# Patient Record
Sex: MALE | Race: OTHER | URBAN - METROPOLITAN AREA
[De-identification: names, ages, dates, MRNs, and addresses within clinical notes are randomized per-mention and may not be internally consistent; named-entity substitution may affect disease eponyms.]

---

## 2023-04-11 ENCOUNTER — OFFICE VISIT (OUTPATIENT)
Dept: FAMILY MEDICINE CLINIC | Facility: CLINIC | Age: 28
End: 2023-04-11

## 2023-04-11 VITALS
DIASTOLIC BLOOD PRESSURE: 80 MMHG | HEIGHT: 71 IN | OXYGEN SATURATION: 98 % | WEIGHT: 190 LBS | HEART RATE: 80 BPM | BODY MASS INDEX: 26.6 KG/M2 | SYSTOLIC BLOOD PRESSURE: 130 MMHG | RESPIRATION RATE: 18 BRPM

## 2023-04-11 DIAGNOSIS — Z76.89 ENCOUNTER TO ESTABLISH CARE: Primary | ICD-10-CM

## 2023-04-11 DIAGNOSIS — K59.09 OTHER CONSTIPATION: ICD-10-CM

## 2023-04-11 DIAGNOSIS — R06.02 SOB (SHORTNESS OF BREATH): ICD-10-CM

## 2023-04-11 PROCEDURE — 3075F SYST BP GE 130 - 139MM HG: CPT | Performed by: FAMILY MEDICINE

## 2023-04-11 PROCEDURE — 99202 OFFICE O/P NEW SF 15 MIN: CPT | Performed by: FAMILY MEDICINE

## 2023-04-11 PROCEDURE — 3079F DIAST BP 80-89 MM HG: CPT | Performed by: FAMILY MEDICINE

## 2023-04-11 PROCEDURE — 3008F BODY MASS INDEX DOCD: CPT | Performed by: FAMILY MEDICINE

## 2023-04-11 RX ORDER — ALBUTEROL SULFATE 90 UG/1
AEROSOL, METERED RESPIRATORY (INHALATION)
COMMUNITY
Start: 2021-06-29

## 2023-04-11 RX ORDER — GARLIC EXTRACT 500 MG
1 CAPSULE ORAL DAILY
COMMUNITY

## 2023-10-25 ENCOUNTER — LAB ENCOUNTER (OUTPATIENT)
Dept: LAB | Age: 28
End: 2023-10-25
Attending: FAMILY MEDICINE

## 2023-10-25 ENCOUNTER — TELEMEDICINE (OUTPATIENT)
Dept: FAMILY MEDICINE CLINIC | Facility: CLINIC | Age: 28
End: 2023-10-25

## 2023-10-25 VITALS
RESPIRATION RATE: 16 BRPM | WEIGHT: 171.38 LBS | DIASTOLIC BLOOD PRESSURE: 71 MMHG | OXYGEN SATURATION: 97 % | HEART RATE: 70 BPM | BODY MASS INDEX: 24.26 KG/M2 | HEIGHT: 70.47 IN | TEMPERATURE: 98 F | SYSTOLIC BLOOD PRESSURE: 117 MMHG

## 2023-10-25 DIAGNOSIS — R63.4 WEIGHT LOSS: ICD-10-CM

## 2023-10-25 DIAGNOSIS — R53.83 FATIGUE, UNSPECIFIED TYPE: ICD-10-CM

## 2023-10-25 DIAGNOSIS — R35.0 URINARY FREQUENCY: Primary | ICD-10-CM

## 2023-10-25 PROCEDURE — 99214 OFFICE O/P EST MOD 30 MIN: CPT | Performed by: FAMILY MEDICINE

## 2023-10-25 PROCEDURE — 3046F HEMOGLOBIN A1C LEVEL >9.0%: CPT | Performed by: INTERNAL MEDICINE

## 2023-10-25 NOTE — H&P
HPI:    Cristobal Basilio is a 33year old male presents for video visit with multiple concerns. Over the past 2 months patient has noticed about a 20 pound, unintentional weight loss. Has not changed diet much. Was exercising regularly but recently stopped. Has also noticed an increase in thirst, at one point was drinking about 3 L of water per day. Feels he urinates frequently, every 1-2 hours. Over the past week, patient has been experiencing an increase in fatigue. Normal sleep habits. HISTORY:  No past medical history on file. No past surgical history on file. Family History   Problem Relation Age of Onset    Pancreatic Cancer Father     Pancreatic Cancer Paternal Grandfather     Uterine Cancer Other       Social History:   Social History     Socioeconomic History    Marital status:    Tobacco Use    Smoking status: Never    Smokeless tobacco: Never   Vaping Use    Vaping Use: Never used   Substance and Sexual Activity    Alcohol use: Yes     Comment: Socially    Drug use: Never        Medications (Active prior to today's visit):  Current Outpatient Medications   Medication Sig Dispense Refill    albuterol 108 (90 Base) MCG/ACT Inhalation Aero Soln Take one puff every 4-6 hours as needed for shortness of breathing (Patient not taking: Reported on 4/11/2023)      cholecalciferol 125 MCG (5000 UT) Oral Tab Take 1 tablet (5,000 Units total) by mouth daily. acidophilus-pectin Oral Cap Take 1 capsule by mouth daily. (Patient not taking: Reported on 4/11/2023)         Allergies:  No Known Allergies      Depression Screening (PHQ-2/PHQ-9): Over the LAST 2 WEEKS                         ROS:   Review of Systems   All other systems reviewed and are negative. PHYSICAL EXAM:   There were no vitals filed for this visit. Physical Exam  Constitutional:       General: He is not in acute distress. Pulmonary:      Effort: Pulmonary effort is normal. No respiratory distress.    Neurological: Mental Status: He is alert. Psychiatric:         Mood and Affect: Mood normal.         ASSESSMENT/PLAN:   (R35.0) Urinary frequency  (primary encounter diagnosis)  (R63.4) Weight loss  (R53.83) Fatigue, unspecified type  Plan: CBC W Differential W Platelet [E], Comp         Metabolic Panel (14) [E], TSH W Reflex To Free         T4 [E], Hemoglobin A1C [E]  -Possible type 2 diabetes, anemia, thyroid disease? Labs ordered, will come to clinic later today to have blood drawn. Will have patient weighed, vitals taken upon arrival.  Will continue to follow    I conducted a telehealth visit with the above named patient, which was completed using two-way, real-time interactive audio and video communication. This has been done in good ksenia to provide continuity of care in the best interest of the provider-patient relationship, due to the COVID -19 public health crisis/national emergency where restrictions of face-to-face office visits are ongoing. Every conscious effort was taken to allow for sufficient and adequate time to complete the visit. The patient was made aware of the limitations of the telehealth visit, including treatment limitations as no physical exam could be performed. The patient was advised to call 911 or to go to the ER in case there was an emergency. The patient was also advised of the potential privacy & security concerns related to the telehealth platform. The patient was made aware of where to find [facility] notice of privacy practices, telehealth consent form and other related consent forms and documents. which are located on the [facility] website. The patient verbally agreed to telehealth consent form, related consents and the risks discussed. Lastly, the patient confirmed that they were in [place]. Included in this visit, time may have been spent reviewing labs, medications, radiology tests and decision making.  Appropriate medical decision-making and tests are ordered as detailed in the plan of care above. Coding/billing information is submitted for this visit based on complexity of care and/or time spent for the visit. Responsible party/patient verbalized understanding of information discussed. No barriers to learning observed. Orders This Visit:  Orders Placed This Encounter      CBC W Differential W Platelet [E]      Comp Metabolic Panel (14) [E]      TSH W Reflex To Free T4 [E]      Hemoglobin A1C [E]      Meds This Visit:  Requested Prescriptions      No prescriptions requested or ordered in this encounter       Imaging & Referrals:  None       The 21st Century cures Act makes medical notes like these available to patients in the interest of transparency. However, be advised that this is a medical document. It is intended as peer to peer communication. It is written in medical language and may contain abbreviations or verbiage that are unfamiliar. It may appear blunt or direct. Medical documents are intended to carry relevant information, facts as evident, and the clinical opinion of the practitioner. This note was created by [employer] voice recognition. Errors in content may be related to improper recognition by the system; efforts to review and correct have been done but errors may still exist. Please contact me with any questions.        10/25/2023  Pam Sifuentes MD

## 2023-10-26 ENCOUNTER — TELEPHONE (OUTPATIENT)
Dept: FAMILY MEDICINE CLINIC | Facility: CLINIC | Age: 28
End: 2023-10-26

## 2023-10-26 ENCOUNTER — HOSPITAL ENCOUNTER (EMERGENCY)
Facility: HOSPITAL | Age: 28
Discharge: HOME | End: 2023-10-26
Attending: EMERGENCY MEDICINE
Payer: COMMERCIAL

## 2023-10-26 VITALS
HEIGHT: 71 IN | RESPIRATION RATE: 21 BRPM | DIASTOLIC BLOOD PRESSURE: 99 MMHG | SYSTOLIC BLOOD PRESSURE: 123 MMHG | BODY MASS INDEX: 23.52 KG/M2 | WEIGHT: 168 LBS | OXYGEN SATURATION: 98 % | TEMPERATURE: 99 F | HEART RATE: 68 BPM

## 2023-10-26 DIAGNOSIS — E11.65 TYPE 2 DIABETES MELLITUS WITH HYPERGLYCEMIA, WITHOUT LONG-TERM CURRENT USE OF INSULIN (HCC): ICD-10-CM

## 2023-10-26 DIAGNOSIS — E11.9 NEW ONSET TYPE 2 DIABETES MELLITUS (HCC): Primary | ICD-10-CM

## 2023-10-26 LAB
ALBUMIN SERPL-MCNC: 4.1 G/DL (ref 3.4–5)
ALBUMIN/GLOB SERPL: 1.1 {RATIO} (ref 1–2)
ALP LIVER SERPL-CCNC: 88 U/L
ALT SERPL-CCNC: 31 U/L
ANION GAP SERPL CALC-SCNC: 9 MMOL/L (ref 0–18)
AST SERPL-CCNC: 24 U/L (ref 15–37)
BASOPHILS # BLD AUTO: 0.04 X10(3) UL (ref 0–0.2)
BASOPHILS NFR BLD AUTO: 0.8 %
BILIRUB SERPL-MCNC: 1.2 MG/DL (ref 0.1–2)
BILIRUB UR QL: NEGATIVE
BUN BLD-MCNC: 7 MG/DL (ref 7–18)
BUN/CREAT SERPL: 7.2 (ref 10–20)
CALCIUM BLD-MCNC: 9.6 MG/DL (ref 8.5–10.1)
CHLORIDE SERPL-SCNC: 101 MMOL/L (ref 98–112)
CLARITY UR: CLEAR
CO2 SERPL-SCNC: 28 MMOL/L (ref 21–32)
COLOR UR: YELLOW
CREAT BLD-MCNC: 0.97 MG/DL
DEPRECATED RDW RBC AUTO: 36.8 FL (ref 35.1–46.3)
EGFRCR SERPLBLD CKD-EPI 2021: 108 ML/MIN/1.73M2 (ref 60–?)
EOSINOPHIL # BLD AUTO: 0.1 X10(3) UL (ref 0–0.7)
EOSINOPHIL NFR BLD AUTO: 2 %
ERYTHROCYTE [DISTWIDTH] IN BLOOD BY AUTOMATED COUNT: 12.2 % (ref 11–15)
GLOBULIN PLAS-MCNC: 3.7 G/DL (ref 2.8–4.4)
GLUCOSE BLD-MCNC: 312 MG/DL (ref 70–99)
GLUCOSE BLDC GLUCOMTR-MCNC: 206 MG/DL (ref 70–99)
GLUCOSE BLDC GLUCOMTR-MCNC: 318 MG/DL (ref 70–99)
GLUCOSE UR-MCNC: 500 MG/DL
HCT VFR BLD AUTO: 42.3 %
HGB BLD-MCNC: 14.6 G/DL
HGB UR QL STRIP.AUTO: NEGATIVE
IMM GRANULOCYTES # BLD AUTO: 0.01 X10(3) UL (ref 0–1)
IMM GRANULOCYTES NFR BLD: 0.2 %
KETONES UR-MCNC: >=160 MG/DL
LEUKOCYTE ESTERASE UR QL STRIP.AUTO: NEGATIVE
LYMPHOCYTES # BLD AUTO: 2.31 X10(3) UL (ref 1–4)
LYMPHOCYTES NFR BLD AUTO: 45.3 %
MCH RBC QN AUTO: 28.9 PG (ref 26–34)
MCHC RBC AUTO-ENTMCNC: 34.5 G/DL (ref 31–37)
MCV RBC AUTO: 83.6 FL
MONOCYTES # BLD AUTO: 0.36 X10(3) UL (ref 0.1–1)
MONOCYTES NFR BLD AUTO: 7.1 %
NEUTROPHILS # BLD AUTO: 2.28 X10 (3) UL (ref 1.5–7.7)
NEUTROPHILS # BLD AUTO: 2.28 X10(3) UL (ref 1.5–7.7)
NEUTROPHILS NFR BLD AUTO: 44.6 %
NITRITE UR QL STRIP.AUTO: NEGATIVE
OSMOLALITY SERPL CALC.SUM OF ELEC: 296 MOSM/KG (ref 275–295)
PH UR: 7 [PH] (ref 5–8)
PLATELET # BLD AUTO: 296 10(3)UL (ref 150–450)
POTASSIUM SERPL-SCNC: 4 MMOL/L (ref 3.5–5.1)
PROT SERPL-MCNC: 7.8 G/DL (ref 6.4–8.2)
PROT UR-MCNC: NEGATIVE MG/DL
RBC # BLD AUTO: 5.06 X10(6)UL
SODIUM SERPL-SCNC: 138 MMOL/L (ref 136–145)
SP GR UR STRIP: 1.02 (ref 1–1.03)
UROBILINOGEN UR STRIP-ACNC: 0.2
WBC # BLD AUTO: 5.1 X10(3) UL (ref 4–11)

## 2023-10-26 PROCEDURE — 80053 COMPREHEN METABOLIC PANEL: CPT

## 2023-10-26 PROCEDURE — 84681 ASSAY OF C-PEPTIDE: CPT | Performed by: INTERNAL MEDICINE

## 2023-10-26 PROCEDURE — 83525 ASSAY OF INSULIN: CPT | Performed by: INTERNAL MEDICINE

## 2023-10-26 PROCEDURE — 81003 URINALYSIS AUTO W/O SCOPE: CPT

## 2023-10-26 PROCEDURE — 85025 COMPLETE CBC W/AUTO DIFF WBC: CPT

## 2023-10-26 PROCEDURE — 85025 COMPLETE CBC W/AUTO DIFF WBC: CPT | Performed by: EMERGENCY MEDICINE

## 2023-10-26 PROCEDURE — 80053 COMPREHEN METABOLIC PANEL: CPT | Performed by: EMERGENCY MEDICINE

## 2023-10-26 PROCEDURE — 99285 EMERGENCY DEPT VISIT HI MDM: CPT

## 2023-10-26 PROCEDURE — 82962 GLUCOSE BLOOD TEST: CPT

## 2023-10-26 PROCEDURE — 96360 HYDRATION IV INFUSION INIT: CPT

## 2023-10-26 PROCEDURE — 99284 EMERGENCY DEPT VISIT MOD MDM: CPT

## 2023-10-26 PROCEDURE — 81003 URINALYSIS AUTO W/O SCOPE: CPT | Performed by: EMERGENCY MEDICINE

## 2023-10-26 RX ORDER — GLIPIZIDE 5 MG/1
5 TABLET ORAL
Qty: 30 TABLET | Refills: 0 | Status: SHIPPED | OUTPATIENT
Start: 2023-10-26 | End: 2023-10-30

## 2023-10-26 RX ORDER — INSULIN ASPART 100 [IU]/ML
0.15 INJECTION, SOLUTION INTRAVENOUS; SUBCUTANEOUS ONCE
Status: COMPLETED | OUTPATIENT
Start: 2023-10-26 | End: 2023-10-26

## 2023-10-26 NOTE — TELEPHONE ENCOUNTER
Patient calling back asking to added information to message    Right abdomen pinching above thigh and below belt line    Able to come in tomorrow if needed

## 2023-10-26 NOTE — TELEPHONE ENCOUNTER
Patient stated that he got a call from Dr Renetta Truong to go the emergency room regarding his high sugars. Patient was calling to verify if should go to urgent care? Advised patient that needs to go to the Emergency room with blood glucose of 749. Patient will go to [facility] ER now.

## 2023-10-26 NOTE — TELEPHONE ENCOUNTER
Dr. Virgil Thomas, please advise on add-on appointment for tomorrow, or if ok to wait until 10/30/23. Thank you. Spoke to patient (name and  of patient verified). He reports he went to [facility] ER and is being discharged now. His current blood glucose level is 230 mg/dL  He reports the initial diagnosis is type I diabetes, but they are still completing their documentation.   Follow-up appointment scheduled:  Future Appointments   Date Time Provider [place]   10/30/2023  1:45 PM Yue Estrada MD [facility]   11/10/2023 10:00 AM Yue Estrada MD [address] [place] [address]

## 2023-10-26 NOTE — TELEPHONE ENCOUNTER
Attempted to call patient, no answer. Spoke to triage who will try him again and instruct him to go to [address] [place] [address] ED.  Thanks

## 2023-10-26 NOTE — TELEPHONE ENCOUNTER
I can see him, but he had a AG of 15 in the Er at [facility] strongly recommend that he be re sent to the hospital, may be at [address] [place] [address]?    He is in DKA and an insulin drip is recommended  Thanks

## 2023-10-26 NOTE — TELEPHONE ENCOUNTER
Spoke to Dr Shyanne aGrcia indicated that patient needs to go to [facility] now. Not sure why they did not admit patient at [facility]. Endocrinology and ER are aware. Tried calling patient but got voicemail. Left message to call the office back and have on call doctor paged. Called spouses number and patient was right next to her. Advised patient that Dr Shyanne Garcia and the endocrinologist reviewed records from [facility] and not sure why he was not admitted. Advised that they are concerned that he is in diabetic ketoacidosis and that he needs to be on an insulin drip. Both the endocrinologist and [facility] are expecting him there and he will be a direct admit. Patient given address to [facility]. Patient indicated that he will go there now.

## 2023-10-27 ENCOUNTER — TELEPHONE (OUTPATIENT)
Dept: ENDOCRINOLOGY CLINIC | Facility: CLINIC | Age: 28
End: 2023-10-27

## 2023-10-27 ENCOUNTER — OFFICE VISIT (OUTPATIENT)
Dept: ENDOCRINOLOGY CLINIC | Facility: CLINIC | Age: 28
End: 2023-10-27

## 2023-10-27 VITALS
WEIGHT: 178 LBS | SYSTOLIC BLOOD PRESSURE: 137 MMHG | BODY MASS INDEX: 25 KG/M2 | DIASTOLIC BLOOD PRESSURE: 81 MMHG | HEART RATE: 86 BPM

## 2023-10-27 DIAGNOSIS — E10.69 TYPE 1 DIABETES MELLITUS WITH OTHER SPECIFIED COMPLICATION (HCC): Primary | ICD-10-CM

## 2023-10-27 DIAGNOSIS — E11.69 TYPE 2 DIABETES MELLITUS WITH OTHER SPECIFIED COMPLICATION, UNSPECIFIED WHETHER LONG TERM INSULIN USE (HCC): Primary | ICD-10-CM

## 2023-10-27 PROBLEM — E10.9 TYPE 1 DIABETES MELLITUS (HCC): Status: ACTIVE | Noted: 2023-10-27

## 2023-10-27 LAB
GLUCOSE BLOOD: 326
INSULIN SERPL-ACNC: 2 MU/L (ref 3–25)
TEST STRIP LOT #: NORMAL NUMERIC

## 2023-10-27 PROCEDURE — 99203 OFFICE O/P NEW LOW 30 MIN: CPT | Performed by: INTERNAL MEDICINE

## 2023-10-27 PROCEDURE — 3079F DIAST BP 80-89 MM HG: CPT | Performed by: INTERNAL MEDICINE

## 2023-10-27 PROCEDURE — 3075F SYST BP GE 130 - 139MM HG: CPT | Performed by: INTERNAL MEDICINE

## 2023-10-27 PROCEDURE — 82947 ASSAY GLUCOSE BLOOD QUANT: CPT | Performed by: INTERNAL MEDICINE

## 2023-10-27 RX ORDER — PEN NEEDLE, DIABETIC 30 GX3/16"
1 NEEDLE, DISPOSABLE MISCELLANEOUS 4 TIMES DAILY
Qty: 400 EACH | Refills: 0 | Status: SHIPPED | OUTPATIENT
Start: 2023-10-27

## 2023-10-27 RX ORDER — INSULIN DEGLUDEC INJECTION 100 U/ML
15 INJECTION, SOLUTION SUBCUTANEOUS NIGHTLY
Qty: 4.5 ML | Refills: 0 | Status: SHIPPED | OUTPATIENT
Start: 2023-10-27 | End: 2023-11-26

## 2023-10-27 RX ORDER — ACYCLOVIR 400 MG/1
1 TABLET ORAL
Qty: 3 EACH | Refills: 2 | Status: SHIPPED | OUTPATIENT
Start: 2023-10-27

## 2023-10-27 RX ORDER — INSULIN LISPRO 100 [IU]/ML
4 INJECTION, SOLUTION INTRAVENOUS; SUBCUTANEOUS
Qty: 15 ML | Refills: 0 | Status: SHIPPED | OUTPATIENT
Start: 2023-10-27 | End: 2023-10-27

## 2023-10-27 RX ORDER — INSULIN DEGLUDEC INJECTION 100 U/ML
15 INJECTION, SOLUTION SUBCUTANEOUS NIGHTLY
Qty: 4.5 ML | Refills: 0 | Status: SHIPPED | OUTPATIENT
Start: 2023-10-27 | End: 2023-10-27

## 2023-10-27 RX ORDER — INSULIN LISPRO 100 [IU]/ML
4 INJECTION, SOLUTION INTRAVENOUS; SUBCUTANEOUS
Qty: 15 ML | Refills: 0 | Status: SHIPPED | OUTPATIENT
Start: 2023-10-27

## 2023-10-27 RX ORDER — INSULIN LISPRO 100 [IU]/ML
INJECTION, SOLUTION INTRAVENOUS; SUBCUTANEOUS
Qty: 15 ML | Refills: 0 | Status: SHIPPED | OUTPATIENT
Start: 2023-10-27 | End: 2023-10-27

## 2023-10-27 NOTE — TELEPHONE ENCOUNTER
Received phone call from Dr. Yohan [facility]. Would like the insulin and c-peptide run under the specimen collected initially from 10/26/23 in the morning instead of the ones from the hospital ER. Called [facility], testing facility, and said they cannot add insulin because of specimen's stability but can add on c-peptide. Per Dr. Yohan [facility], please add C-peptide. Gabbi Garrido from [place] added C-peptide and turn around time would be mid next week.

## 2023-10-27 NOTE — TELEPHONE ENCOUNTER
Noted  Patient has read the my chart message  Please see if labs can be added on to blood work from [facility] as discussed     Thanks

## 2023-10-27 NOTE — PROGRESS NOTES
Reviewed the following and printed to pt:    Diabetes Medications:  Tresiba: 15 units daily (PM)    *Take this insulin once a day around every 24 hours (does not matter about food)   *This insulin is in your system for 24 hours   *This is your base insulin  Glipizide:   Breakfast: 1 tab (5mg)   Dinner: 1 tab (5mg)   *This medication tell yours pancreas to release more insulin  Humalog: We have sent this in but hold off on taking it for right now. If by Alessandro evening, your blood sugars are still over 250 mg/dl before meals, stop Glipizide and start 4 units of Humalog with meals    *Take this insulin about 15-20 minutes before eating   *This is in your system for 3 hours   *This balances out your food and your blood sugar    Blood Sugar Monitoring:  Start Dexcom G7  Goals   Fastin -130 mg/dl   2 hours after meals: Less than 180 mg/dl    Low Blood Sugar:  A low blood sugar is less than 80 mg/dl  If you have symptoms of low blood sugar (shakiness, sweatiness), treat with 4 oz of juice or 4 oz pop. Check again in 15 minutes, if still low, treat again with 4oz of juice or pop.  Repeat process    Follow Up:  I will call you on Monday around 9:45am/10am (phone call)  2023 10:30am Jackson Vega [facility])  2024 3:45pm Dr Albino Capellan [facility])        Takeaways:  Get labs which will help determine type 1 or type 2 diabetes  We will adjust your medications as we go based on your blood sugar levels

## 2023-10-27 NOTE — TELEPHONE ENCOUNTER
Please make sure that he has the address for [facility], can my chart him   Since he typically goes to OP     Thanks

## 2023-10-27 NOTE — TELEPHONE ENCOUNTER
Called reference lab and spoke with Kimberley    Insulin AB - cannot be added as it needs to be frozen right away    Insulin and c-peptide - able to add as long as there is enough specimen. They will call the office if there is any issues adding. She needed the order to be put under [facility] (it was originally [facility]). RN changed facility to [facility]. JONO Iyer   Also patient called back and confirmed appointment. Please see below message from CSS. [place] result:  Lab Results   Component Value Date    PPDREDNESS 2 08/25/2021    PPDINDURATIO 0 08/25/2021     Is induration greater than 5mm?  Katherin HAIDER RN

## 2023-10-27 NOTE — TELEPHONE ENCOUNTER
Received verbal order from Dr. Erika Brandon to cancel tresiba and humalog that was sent to [facility] pharmacy as patient changed their mind and would like to go to [facility] in [address] and tresiba    [address] that were sent not showing up in their system yet. RN gave verbal order based on RX sent by Dr. Erika Brandon in the system. Insurance is only allowing 30 days claim. Humalog $35 copay  Tresiba $100.77 >> RN gave patient tresiba co-pay card to bring to pharmacy to reduce co-pay.

## 2023-10-27 NOTE — TELEPHONE ENCOUNTER
Patient Belongings- Misc  Marissa García w/ Labels  Jeans  Boots  Socks  Benson Shirt  Jacket  Cell Phone  Portable Radios x 2  Pager  [facility] (Counted by PD given to Security )     Toll Donato  02/01/20 3568 Received phone call from Dr. Raul Montes. Pt has new onset type 1 DM dx at ER, on DKA, not admitted due to improving labs. PCP reached out to Dr. Raul Montes to have patient be seen ASAP. RN called patient as there is a 1:30 PM spot on her schedule today. Call went straight to voicemail. Left detailed message to call office back regarding appointment. RN also reached out to patient's wife Rikki, call also went straight to  (left detailed message also). Altheost message sent regarding appointment. Waiting for call back/response.      JONO oMntes, Dr. Tammi Barragan

## 2023-10-27 NOTE — ED QUICK NOTES
Pt sent to ed by [facility] Nurse. The pt states he was seen at [address] [place] [address] yesterday in [place] [address] where some blood work was done. The pt states that this morning he received a call from the MD who told him to come to ED due to having glucose of 749 from blood work yesterday. The pt states he went to [facility] where he was treated for hyperglycemia and was able to get it to the 200s. Pt left and after received a call from a nurse from [facility] who advised him to come to [facility] ED per MD recommendation. Pt denies hx of diabetes. In room with significant other in no obvious distress.

## 2023-10-28 LAB
ABSOLUTE BASOPHILS: 49 CELLS/UL (ref 0–200)
ABSOLUTE EOSINOPHILS: 30 CELLS/UL (ref 15–500)
ABSOLUTE LYMPHOCYTES: 1110 CELLS/UL (ref 850–3900)
ABSOLUTE MONOCYTES: 327 CELLS/UL (ref 200–950)
ABSOLUTE NEUTROPHILS: 2284 CELLS/UL (ref 1500–7800)
ALBUMIN/GLOBULIN RATIO: 1.6 (CALC) (ref 1–2.5)
ALBUMIN: 4.6 G/DL (ref 3.6–5.1)
ALKALINE PHOSPHATASE: 91 U/L (ref 36–130)
ALT: 22 U/L (ref 9–46)
AST: 16 U/L (ref 10–40)
BASOPHILS: 1.3 %
BILIRUBIN, TOTAL: 0.8 MG/DL (ref 0.2–1.2)
BUN: 13 MG/DL (ref 7–25)
C-PEPTIDE: 0.68 NG/ML (ref 0.8–3.85)
CALCIUM: 10 MG/DL (ref 8.6–10.3)
CARBON DIOXIDE: 25 MMOL/L (ref 20–32)
CHLORIDE: 92 MMOL/L (ref 98–110)
CREATININE: 0.96 MG/DL (ref 0.6–1.24)
EGFR: 110 ML/MIN/1.73M2
EOSINOPHILS: 0.8 %
GLOBULIN: 2.8 G/DL (CALC) (ref 1.9–3.7)
GLUCOSE: 749 MG/DL (ref 65–99)
HEMATOCRIT: 45.8 % (ref 38.5–50)
HEMOGLOBIN A1C: 12.6 % OF TOTAL HGB
HEMOGLOBIN: 15.1 G/DL (ref 13.2–17.1)
LYMPHOCYTES: 29.2 %
MCH: 29.6 PG (ref 27–33)
MCHC: 33 G/DL (ref 32–36)
MCV: 89.8 FL (ref 80–100)
MONOCYTES: 8.6 %
MPV: 10.7 FL (ref 7.5–12.5)
NEUTROPHILS: 60.1 %
PLATELET COUNT: 307 THOUSAND/UL (ref 140–400)
POTASSIUM: 5.1 MMOL/L (ref 3.5–5.3)
PROTEIN, TOTAL: 7.4 G/DL (ref 6.1–8.1)
RDW: 12.3 % (ref 11–15)
RED BLOOD CELL COUNT: 5.1 MILLION/UL (ref 4.2–5.8)
SODIUM: 129 MMOL/L (ref 135–146)
TSH W/REFLEX TO FT4: 0.75 MIU/L (ref 0.4–4.5)
WHITE BLOOD CELL COUNT: 3.8 THOUSAND/UL (ref 3.8–10.8)

## 2023-10-29 LAB — C-PEPTIDE: 0.8 NG/ML

## 2023-10-30 ENCOUNTER — TELEPHONE (OUTPATIENT)
Dept: ENDOCRINOLOGY CLINIC | Facility: CLINIC | Age: 28
End: 2023-10-30

## 2023-10-30 ENCOUNTER — OFFICE VISIT (OUTPATIENT)
Dept: FAMILY MEDICINE CLINIC | Facility: CLINIC | Age: 28
End: 2023-10-30

## 2023-10-30 ENCOUNTER — LAB ENCOUNTER (OUTPATIENT)
Dept: LAB | Age: 28
End: 2023-10-30
Attending: FAMILY MEDICINE
Payer: COMMERCIAL

## 2023-10-30 ENCOUNTER — NURSE ONLY (OUTPATIENT)
Dept: ENDOCRINOLOGY CLINIC | Facility: CLINIC | Age: 28
End: 2023-10-30

## 2023-10-30 VITALS
DIASTOLIC BLOOD PRESSURE: 72 MMHG | SYSTOLIC BLOOD PRESSURE: 113 MMHG | BODY MASS INDEX: 25 KG/M2 | WEIGHT: 176.81 LBS | HEART RATE: 72 BPM

## 2023-10-30 DIAGNOSIS — R10.9 ABDOMINAL PAIN, UNSPECIFIED ABDOMINAL LOCATION: ICD-10-CM

## 2023-10-30 DIAGNOSIS — R10.2 PELVIC PAIN: ICD-10-CM

## 2023-10-30 DIAGNOSIS — E10.69 TYPE 1 DIABETES MELLITUS WITH OTHER SPECIFIED COMPLICATION (HCC): Primary | ICD-10-CM

## 2023-10-30 DIAGNOSIS — E10.9 TYPE 1 DIABETES MELLITUS WITHOUT COMPLICATION (HCC): Primary | ICD-10-CM

## 2023-10-30 PROCEDURE — 3074F SYST BP LT 130 MM HG: CPT | Performed by: FAMILY MEDICINE

## 2023-10-30 PROCEDURE — 99211 OFF/OP EST MAY X REQ PHY/QHP: CPT | Performed by: INTERNAL MEDICINE

## 2023-10-30 PROCEDURE — 3078F DIAST BP <80 MM HG: CPT | Performed by: FAMILY MEDICINE

## 2023-10-30 PROCEDURE — 99214 OFFICE O/P EST MOD 30 MIN: CPT | Performed by: FAMILY MEDICINE

## 2023-10-30 RX ORDER — INSULIN LISPRO 100 [IU]/ML
INJECTION, SOLUTION INTRAVENOUS; SUBCUTANEOUS
Qty: 15 ML | Refills: 0 | Status: SHIPPED | OUTPATIENT
Start: 2023-10-30

## 2023-10-30 RX ORDER — BLOOD SUGAR DIAGNOSTIC
STRIP MISCELLANEOUS 3 TIMES DAILY
COMMUNITY
Start: 2023-10-26 | End: 2023-11-25

## 2023-10-30 NOTE — TELEPHONE ENCOUNTER
Dr. Yohan [facility]    Patient may have high copay due to high deductible - may be cheaper for patient to pay for janine 3 out of pocket if willing - please advise.

## 2023-10-30 NOTE — H&P
HPI:    Aura Kiser is a 33year old male presents clinic for ER/endocrinology follow-up. Recent diagnosis with type 1 diabetes. Currently taking insulin, also has a Dexcom 7. Overall, feels well. Feels fatigue has improved. No longer experiencing increase in thirst, urinary frequency. Additionally, patient reports abdominal pain/pelvic pain. Starts typically in his upper abdomen, radiates to his pelvis. Dull, intermittent. Has been happening for the past 2 months. Normal bowel movements and urination. Not affected by positional changes, exertional activity. Concerned with family history of prostate/pancreatic cancer and recent diagnosis of type 1 diabetes that there is something else wrong    HISTORY:  Past Medical History:   Diagnosis Date    Type 1 diabetes mellitus ([facility] [address]       History reviewed. No pertinent surgical history. Family History   Problem Relation Age of Onset    Pancreatic Cancer Father     Pancreatic Cancer Paternal Grandfather     Uterine Cancer Other       Social History:   Social History     Socioeconomic History    Marital status:    Tobacco Use    Smoking status: Never     Passive exposure: Never    Smokeless tobacco: Never   Vaping Use    Vaping Use: Never used   Substance and Sexual Activity    Alcohol use: Not Currently    Drug use: Never        Medications (Active prior to today's visit):  Current Outpatient Medications   Medication Sig Dispense Refill    Glucose Blood (FREESTYLE INSULINX TEST) In Vitro Strip by Other route 3 (three) times daily. Insulin Lispro, 1 Unit Dial, (HUMALOG KWIKPEN) 100 UNIT/ML Subcutaneous Solution Pen-injector Inject 4 units with breakfast, 4 units with lunch, and 4 units with dinner 15 mL 0    Insulin Degludec (TRESIBA FLEXTOUCH) 100 UNIT/ML Subcutaneous Solution Pen-injector Inject 0.15 mL (15 Units total) into the skin at bedtime.  4.5 mL 0    Insulin Lispro, 1 Unit Dial, (HUMALOG KWIKPEN) 100 UNIT/ML Subcutaneous Solution Pen-injector Inject 4 Units into the skin 3 (three) times daily with meals. 15 mL 0    Insulin Pen Needle (PEN NEEDLES) 32G X 4 MM Does not apply Misc 1 each 4 (four) times daily. 400 each 0    Continuous Blood Gluc Sensor (DEXCOM G7 SENSOR) Does not apply Misc 1 each Every 10 days. Change sensor every 10 days 3 each 2    cholecalciferol 125 MCG (5000 UT) Oral Tab Take 1 tablet (5,000 Units total) by mouth daily. Allergies:  No Known Allergies      Depression Screening (PHQ-2/PHQ-9): Over the LAST 2 WEEKS                         ROS:   Review of Systems   All other systems reviewed and are negative. PHYSICAL EXAM:      10/30/23  1343   BP: 113/72   BP Location: Right arm   Patient Position: Sitting   Cuff Size: adult   Pulse: 72   Weight: 176 lb 12.8 oz (80.2 kg)     Physical Exam  Vitals reviewed. Constitutional:       General: He is not in acute distress. Cardiovascular:      Rate and Rhythm: Normal rate. Pulmonary:      Effort: Pulmonary effort is normal. No respiratory distress. Abdominal:      General: Bowel sounds are normal. There is no distension. Palpations: Abdomen is soft. There is no mass. Neurological:      Mental Status: He is alert. ASSESSMENT/PLAN:   (E10.9) Type 1 diabetes mellitus without complication (HCC)  (primary encounter diagnosis)  Plan:   - Endo notes reviewed, to continue current management. Follow up with endo as advised     (R10.9) Abdominal pain, unspecified abdominal location  (R10.2) Pelvic pain  Plan: CT ABDOMEN+PELVIS(CPT=74176)  -Labs reviewed with patient. Unremarkable physical exam.  CT ordered. Will continue to follow         Responsible party/patient verbalized understanding of information discussed. No barriers to learning observed. Orders This Visit:  No orders of the defined types were placed in this encounter.       Meds This Visit:  Requested Prescriptions      No prescriptions requested or ordered in this encounter Imaging & Referrals:  CT ABDOMEN+PELVIS(CPT=74176)       The 21st Century cures Act makes medical notes like these available to patients in the interest of transparency. However, be advised that this is a medical document. It is intended as peer to peer communication. It is written in medical language and may contain abbreviations or verbiage that are unfamiliar. It may appear blunt or direct. Medical documents are intended to carry relevant information, facts as evident, and the clinical opinion of the practitioner. This note was created by [employer] voice recognition. Errors in content may be related to improper recognition by the system; efforts to review and correct have been done but errors may still exist. Please contact me with any questions.        10/30/2023  Phoebe Chaudhry MD

## 2023-10-30 NOTE — TELEPHONE ENCOUNTER
Pt spoke with his insurance which states PA is required for Dexcom G7    Medication PA Requested:   Dexcom G7 Sensors                                                         CoverMyMeds Used:  Key:  Quantity: 9 sensors  Day Supply: 90 days  Sig: Change sensor every 10 days  DX Code:  E10.9                                   CPT code (if applicable):   Case Number/Pending Ref#:

## 2023-10-30 NOTE — TELEPHONE ENCOUNTER
Medication PA Requested: Dexcom G7 Sensors  CoverMyMeds Used: Yes  Key: [id number]  Quantity: 3  Day Supply: 30  Sig: Change sensor every 10 days  DX Code:  E10.9                                   CPT code (if applicable):   Case Number/Pending Ref#:                Message to endo, please advise    Per CMM, Drug is covered by current benefit plan.  No further PA activity needed    [facility] at [phone], spoke with states Sam patient copay is $431.06

## 2023-10-30 NOTE — PROGRESS NOTES
Continuous Glucose Monitor Download  Phone Call [phone]    Start Time: 9:46am  End Time: 12:24pm   --> Of note, back and forth with phone calls; not all consistent    Indication: Pt is newly diagnosed DM by Dr Ciara Iyer; labs pending for determinations of T1D vs T2D. At last appointment AM 10/27/23, pt was started on Tresiba and Glipizide with Humalog corrections. Additionally, started on Dexcom G7. Appointment today for download. A1c: 12.6% (10/25/2023)    Current Diabetes Medication:  Tresiba 15 units daily (PM)  Glipizide 5mg BID  Humalog PRN if BG > 250 4 units TID    Sensor Type: Dexcom G7    Reviewed CGMS download and patient logs:  Days of sensor use: 10/27/23-10/30/23 4 day  Average glucose (mg/dL): 253 mg/dl  Estimated GMI: 9.4%  Standard deviation =/- 18.1 (mg/dL)  Target Ranges:  mg/dL          1% of time in range  0% of time below range  99% of time above range            Recommended medication changes/Plan:  - Reviewed dexcom download which shows improved blood sugars but still elevated. Fasting have been in 220's mg/dl and post prandials 250-300 mg/dl. Of note, pt was unable to start Humalog due to accidentally putting it in the freezer. - Discussed most likely Karlos Jeans is lowering blood sugar rather than Glipizide. Additionally, C peptide resulted on the lower end as note below  - Per pt, insulin antibodies still in process and C peptide resulted at 0.8  - Pt called pharmacy and insurance. Insurance state they will replace Lispro box but Humalog is covered.  Will send in for Humalog and pt can  today  - Reviewed carbohydrates verses glycemic index as patient has been utilizing glycemic index  - Pt stating Dexcom requires PA; refer to TE 10/30/2023  - Plan discussed with Dr Ciara Iyer over phone  - Medication adjustments   Karlos Jeans 15 units daily   Humalog 4 units TID with meals   Stop Glipizide    Mychart message sent with food recommendations from Dr Ciara Iyer, updated regimen, tresiba savings card, and Omnipod 5    Updated Diabetes Medication:  Bonnita Bullocks 15 units daily (PM)  Humalog 4 units TID with meals    Follow up:  11/8/2023 [facility]  2/13/2024 Dr Andrés Hansen

## 2023-10-31 ENCOUNTER — TELEPHONE (OUTPATIENT)
Dept: FAMILY MEDICINE CLINIC | Facility: CLINIC | Age: 28
End: 2023-10-31

## 2023-10-31 LAB
CHOL/HDLC RATIO: 4.3 (CALC)
CHOLESTEROL, TOTAL: 173 MG/DL
CREATININE, RANDOM URINE: 40 MG/DL (ref 20–320)
HDL CHOLESTEROL: 40 MG/DL
LDL-CHOLESTEROL: 116 MG/DL (CALC)
MICROALBUMIN: <0.2 MG/DL
NON-HDL CHOLESTEROL: 133 MG/DL (CALC)
TRIGLYCERIDES: 77 MG/DL

## 2023-10-31 NOTE — TELEPHONE ENCOUNTER
FMLA and bianca received via [employer] and logged for processing.  2 separate [employer] messages sent for FABRICIO[employer]

## 2023-11-01 ENCOUNTER — TELEPHONE (OUTPATIENT)
Dept: ENDOCRINOLOGY CLINIC | Facility: CLINIC | Age: 28
End: 2023-11-01

## 2023-11-01 ENCOUNTER — PATIENT MESSAGE (OUTPATIENT)
Dept: ENDOCRINOLOGY CLINIC | Facility: CLINIC | Age: 28
End: 2023-11-01

## 2023-11-01 NOTE — TELEPHONE ENCOUNTER
Received fax from [facility] attached is pt recent lab results collected on 10/30/23, results placed in provider folder for review.

## 2023-11-03 DIAGNOSIS — E10.69 TYPE 1 DIABETES MELLITUS WITH OTHER SPECIFIED COMPLICATION (HCC): ICD-10-CM

## 2023-11-03 RX ORDER — BLOOD-GLUCOSE SENSOR
1 EACH MISCELLANEOUS
Qty: 6 EACH | Refills: 0 | Status: SHIPPED | OUTPATIENT
Start: 2023-11-03

## 2023-11-03 RX ORDER — INSULIN DEGLUDEC INJECTION 100 U/ML
15 INJECTION, SOLUTION SUBCUTANEOUS NIGHTLY
Qty: 4.5 ML | Refills: 0 | Status: SHIPPED | OUTPATIENT
Start: 2023-11-03 | End: 2023-12-03

## 2023-11-03 NOTE — TELEPHONE ENCOUNTER
From: Flakita Culver  To: Roxanacari Barbour  Sent: 11/1/2023 1:42 PM CDT  Subject: Response to Cholestrol Q    Hi Dr Linda Cueva,     I believe the cholestrol thing is something I remember from my past health check up and I can get it better via healthier habits / lifestyle. On the insulin antibodies test, what do you infer? Is there a clearer cause for the condition?      Arelis Gaines

## 2023-11-03 NOTE — TELEPHONE ENCOUNTER
Pt requesting refills to be sent to [facility]. Pt is currently using Dexcom but wants to change to Mustapha 3, and will need a new RX    Pt is requesting the Lispro be changed to 200 units/ ml          Insulin Lispro, 1 Unit Dial, (HUMALOG KWIKPEN) 100 UNIT/ML Subcutaneous Solution Pen-injector, Inject 4 units with breakfast, 4 units with lunch, and 4 units with dinner, Disp: 15 mL, Rfl: 0    Insulin Degludec (TRESIBA FLEXTOUCH) 100 UNIT/ML Subcutaneous Solution Pen-injector, Inject 0.15 mL (15 Units total) into the skin at bedtime. , Disp: 4.5 mL, Rfl: 0

## 2023-11-04 LAB — INSULIN AUTOANTIBODY: <0.4 U/ML

## 2023-11-06 ENCOUNTER — TELEPHONE (OUTPATIENT)
Dept: CASE MANAGEMENT | Age: 28
End: 2023-11-06

## 2023-11-06 NOTE — PROGRESS NOTES
Continuous Glucose Monitor Download    Pt thought it was virtual appt- changed to phone appt    Start Time: 10:45am  End Time: 11:33am    Indication: Pt is newly diagnosed DM followed by Dr Mansi Rubio. At last appointment with [facility] 10/30/2023 pt was started on Humalog and stopped Glipizide. Appointment today for download and adjustments    A1c: 12.6% (10/25/2023)    Current Diabetes Medication:  Tresiba 15 units daily (PM)  Humalog 4 units TID with meals    Sensor Type: Dexcom G7    Reviewed CGMS download and patient logs:  Days of sensor use: 10/24/23-11/6/23  Average glucose (mg/dL): 224 mg/dl  Estimated GMI: 8.7%  Standard deviation =/- 24.9 (mg/dL)  Target Ranges:  mg/dL          21% of time in range  0% of time below range  79% of time above range          Recommended medication changes/Plan:  - Pt transitioned to [facility] a few days ago. Reviewed both dexcom and janine data. On most mornings, there is a rise around 3-5am. Post prandials for brunch and snack PM typically around 160-200 mg/dl. Cesar Aj has highest post prandials around 200 mg/dl and on restaurant food dinners, closer to 250-300 mg/dl. Will adjust insulin as noted below  - Since diagnosis, blood sugars have come down and are overall stable. Applauded pt. He states he is feeling more comfortable which is why he has self adjusted his insulin for higher carb meals.  Reviewed the severity of self adjusting insulin  - Reviewed labs and T1D/IBIS and causes  - Advised to give us an update on Friday and sooner if low blood sugars occurred   - Mychart sent with recommendations  - Medication adjustments   Tresiba 15 --> 17 units daily (PM)   Humalog    Brunch 4 --> 5 units    Snack in PM 4 --> 5 units    Dinner      Meal: 4 --> 6 units     Restaurant: 8 --> 10 units    Updated Diabetes Medication:  Tresiba 17 units daily (PM)  Humalog   Brunch: 5 units   Snack in PM: 5 units   Dinner:    Normal: 6 units    Restaurant: 10 units    Follow up:  2/13/2024  [address]

## 2023-11-08 ENCOUNTER — NURSE ONLY (OUTPATIENT)
Dept: ENDOCRINOLOGY CLINIC | Facility: CLINIC | Age: 28
End: 2023-11-08

## 2023-11-08 ENCOUNTER — TELEPHONE (OUTPATIENT)
Dept: ENDOCRINOLOGY CLINIC | Facility: CLINIC | Age: 28
End: 2023-11-08

## 2023-11-08 ENCOUNTER — PATIENT MESSAGE (OUTPATIENT)
Dept: ENDOCRINOLOGY CLINIC | Facility: CLINIC | Age: 28
End: 2023-11-08

## 2023-11-08 DIAGNOSIS — E10.69 TYPE 1 DIABETES MELLITUS WITH OTHER SPECIFIED COMPLICATION (HCC): Primary | ICD-10-CM

## 2023-11-08 PROCEDURE — 99211 OFF/OP EST MAY X REQ PHY/QHP: CPT | Performed by: INTERNAL MEDICINE

## 2023-11-08 NOTE — TELEPHONE ENCOUNTER
Pt thought appt he had for today was a virtual visit - wants to reschedule - asking if appt can be virtual

## 2023-11-13 NOTE — TELEPHONE ENCOUNTER
Reviewed [employer] download for the last week. Blood sugars overall stable and greatly improved. Fasting blood sugars 120-140's mg/dl since making changes. Post prandials at goal. There were two instances of low blood sugar. One post lunch and the other post dinner both resulting in hyperglycemia    11/7/23-11/13/23    TIR 54%  Below 1%  High 45%    Avg 188 mg/dl  Estimated GMI 7.6%          Advised to continue current regimen as they are at goal and improved. Inquired about two low blood sugars (difference in meal, timing, etc).  Additionally review treatment of low blood sugar with rule of 15

## 2023-11-15 ENCOUNTER — PATIENT MESSAGE (OUTPATIENT)
Dept: FAMILY MEDICINE CLINIC | Facility: CLINIC | Age: 28
End: 2023-11-15

## 2023-11-15 NOTE — TELEPHONE ENCOUNTER
CT ABDOMEN+PELVIS     [employer] message sent to patient in response to [employer] message received--->see message.

## 2023-11-15 NOTE — TELEPHONE ENCOUNTER
Dr Jean Carlos Dennis,     Pt is seeking continuous fmla and disability due to diabetes. Start date 10/26/23-12/31/23. Do you support? Pt states forms are due this Friday.       Thanks,     Maicol Sánchez

## 2023-11-15 NOTE — TELEPHONE ENCOUNTER
Called patient and LMTCB - Need more details on FMLA and Disab forms    Type of Leave:   Reason for Leave:  Start date of leave:   How much time needed?:   Forms Due Date:  Was Fee and Turnaround info Given?:

## 2023-11-15 NOTE — TELEPHONE ENCOUNTER
Returned pt call, Pt stts fmla and disab are due to pts diabetes. Pt seeking continuous fmla start date 10/26/23-12/31/23. Informed pt we will need to task provider for auth. Pt stts forms are needed by this Friday. Informed pt we need fax number of HR. Pt sttd all forms are to be faxed to [facility] fax # [phone] and auth given via [employer].

## 2023-11-17 ENCOUNTER — TELEPHONE (OUTPATIENT)
Dept: ENDOCRINOLOGY CLINIC | Facility: CLINIC | Age: 28
End: 2023-11-17

## 2023-11-18 ENCOUNTER — TELEPHONE (OUTPATIENT)
Dept: ENDOCRINOLOGY CLINIC | Facility: CLINIC | Age: 28
End: 2023-11-18

## 2023-11-18 NOTE — TELEPHONE ENCOUNTER
Current Outpatient Medications   Medication Sig Dispense Refill    Insulin Degludec (TRESIBA FLEXTOUCH) 100 UNIT/ML Subcutaneous Solution Pen-injector Inject 0.17 mL (17 Units total) into the skin at bedtime.  6 mL 2     Key # [id number]

## 2023-11-20 NOTE — TELEPHONE ENCOUNTER
Please try to avoid signing forms in the corner as it is not visible when printing and forms are not accepted this way. Thank you! Dr. Martina Martin      *The ACKNOWLEDGE button has been moved to the top right ribbon*    Please sign off on form if you agree to: FMLA and Disability as discussed below.   (place your signature on the first page only)    -From your Inbasket, Highlight the patient and click Chart   -Double click the 88/81/76 Forms Completion telephone encounter  -Scroll down to the Media section   -Click the blue Hyperlink: FMLA Dr Martina Martin 11/20/23 and Disab Dr Martina Martin 18/20/11  -Click Acknowledge located in the top right ribbon/menu   -Drag the mouse into the blank space of the document and a + sign will appear. Left click to   electronically sign the document.      Thank you,    Nena

## 2023-11-20 NOTE — TELEPHONE ENCOUNTER
Called and spoke with pt; refer to [facility] 11/15/23    Pt believes a PA is not needed as he is about to pick it up from [facility]; think [facility] tried to fill after. He will reach out if there are any issues.

## 2023-11-28 ENCOUNTER — TELEPHONE (OUTPATIENT)
Dept: FAMILY MEDICINE CLINIC | Facility: CLINIC | Age: 28
End: 2023-11-28

## 2023-11-28 NOTE — TELEPHONE ENCOUNTER
Patient called, verified Name and . He is requesting for CT abdomen/pelvis order to be faxed to [facility], he is unable to provide the fax number. Attempted to call [facility], no answer. [employer] message sent.

## 2023-12-05 ENCOUNTER — PATIENT MESSAGE (OUTPATIENT)
Dept: ENDOCRINOLOGY CLINIC | Facility: CLINIC | Age: 28
End: 2023-12-05

## 2023-12-06 ENCOUNTER — TELEPHONE (OUTPATIENT)
Dept: ENDOCRINOLOGY CLINIC | Facility: CLINIC | Age: 28
End: 2023-12-06

## 2023-12-07 ENCOUNTER — PATIENT MESSAGE (OUTPATIENT)
Dept: FAMILY MEDICINE CLINIC | Facility: CLINIC | Age: 28
End: 2023-12-07

## 2023-12-07 NOTE — TELEPHONE ENCOUNTER
RN submitted PA via [employer] - no PA needed for Dexcom G7. Sent to patient. Also discussed with Arianna huerta RN. Patient can have insurance call or fax office.

## 2023-12-07 NOTE — TELEPHONE ENCOUNTER
Please see TE 10/30 - PA was submitted for Dexcom G7 - patient was made aware of outcome    \"Our PA department got back to us regarding your Dexcom. The Dexcom is covered by your insurance but it appears your copay for the Dexcom is $431.06 for a 30 day supply (3 sensors). \"

## 2023-12-07 NOTE — TELEPHONE ENCOUNTER
Spoke to patient regarding message - patient was very upset and didn't understand that his coap could not be reduced for Dexcom G7. Per TE 10/30 office did PA for Dexcom which is covered at high copay due to deductible with plan that office does not have any control over. Patient inquired which insurance plan he should consider and if office offered coaching for insurance. I told him that he should call his insurance or employer to see what they recommend since we do not do this. I advised him that he should consider an affordable plan with reduced copays.

## 2023-12-07 NOTE — TELEPHONE ENCOUNTER
Pt called with further information from pharmacy. Pt states that no prior auth was received at pharmacy. Please call.

## 2023-12-08 NOTE — TELEPHONE ENCOUNTER
Reviewed notes. It sounds like endocrinology is doing everything they can.  He can look into endocrinology at [facility] or another organization

## 2023-12-08 NOTE — TELEPHONE ENCOUNTER
Dr. Meek , I have reviewed the endo notes and communications regarding this issues and they seem appropriate to me. And they are certainly more versed in handling getting the dexcom covered than we are.  I'm happy to reply to patient if you would like.       Patient also asking for new endo referral.

## 2023-12-08 NOTE — TELEPHONE ENCOUNTER
Discussed with Dr Carla Hoskins and Rashid Vigil RN.  Advised to route to [employer] supervisor as pt is unhappy with his care (refer to [facility] 12/7/23 to PCP) despite us working with insurance and updating patient

## 2023-12-08 NOTE — TELEPHONE ENCOUNTER
RN called insurance to see if PA is needed. RN called [employer] and spoke to ANTONY. Per Eleuterio Stone, patient doesn't have prescription insurance. Dexcom G7 prescription is being covered with a [employer] savings card. She pulled up a different commercial plan with different information but was unable to tell me if this is current. Message sent to patient with update. Advised for him to upload current insurance card front and back and if it is same card we have on file, patient will need to call [employer] to let them know.

## 2023-12-11 ENCOUNTER — TELEPHONE (OUTPATIENT)
Dept: ENDOCRINOLOGY CLINIC | Facility: CLINIC | Age: 28
End: 2023-12-11

## 2023-12-11 ENCOUNTER — MED REC SCAN ONLY (OUTPATIENT)
Dept: ENDOCRINOLOGY CLINIC | Facility: CLINIC | Age: 28
End: 2023-12-11

## 2023-12-11 NOTE — TELEPHONE ENCOUNTER
Received fax from [facility] attached is a prescription for omnipod, form placed in provider folder for review and signature.

## 2023-12-14 ENCOUNTER — PATIENT MESSAGE (OUTPATIENT)
Dept: ENDOCRINOLOGY CLINIC | Facility: CLINIC | Age: 28
End: 2023-12-14

## 2023-12-14 ENCOUNTER — TELEPHONE (OUTPATIENT)
Dept: FAMILY MEDICINE CLINIC | Facility: CLINIC | Age: 28
End: 2023-12-14

## 2023-12-14 NOTE — TELEPHONE ENCOUNTER
Dr. Summer Olivares     Please advise. Hypoglycemia    Onset of hypoglycemia: last few days     BG levels: Natalia report placed in folder   Pattern of hypoglycemia (occurring mostly when/random?): overnight and 1 time in afternoon    Symptoms : lightheadedness, sweating, denies: shakiness  Acute illness: none     Hypoglycemia Mx/Rule of 15: ate toffee, snickers, chocolate. -RN went into detail regarding appropriate 15g carb supplements for low blood sugar. Patient has not confirmed BG with fingerstick, RN advised, verbalized understanding and acknowledged. Change in Diet: low carb diet     List Medications/Compliance:     - 4-6 units of humalog after my meals (patient's last injection was last night and had 6 units)     -10 units of tresiba on Alternative days-changed 10 days ago  and patient self changed it, worked for 5-6 days     -patient has not been double checking with fingerstick.

## 2023-12-14 NOTE — TELEPHONE ENCOUNTER
Reviewed CGM - he is having rare episodes of hypoglycemia but no clear pattern. However [place] should not be dosed every other day. Please change to [place] 6 units subcutaneous daily. The prandial insulin should be dosed before meals and not after. Will Quintanilla - do you have time to call him to discuss appropriate insulin administration? Thanks.

## 2023-12-14 NOTE — TELEPHONE ENCOUNTER
He had two overnight which is likely due to [place] dose and 2 post prandial episodes. Suspect the post prandial episodes are due to matching of insulin with CHO intake. In the future Dr. Yohan Drummond mentioned carbohydrate counting. Lets change the [place] as noted below to see if that helps with the overnight. Thanks.

## 2023-12-14 NOTE — TELEPHONE ENCOUNTER
Looks like Dr. Giancarlo Huber office has handled his insurance issues. Please confirm with patient and cancel appt on 12/22 with me.  Thanks

## 2023-12-14 NOTE — TELEPHONE ENCOUNTER
Please call the patient  Why is he taking tresiba on alternate days? Did he slef adjust --> please discuss that he should not do that   Can you please show his download to Rosie/ dr Jared Barton to see if he has any other lows?      Also please explain to the patient that since he is newly diagnosed, Bg can take time to adjust     He also should start carb counting     Rosie: feel free to call me on my phone    Thanks

## 2023-12-14 NOTE — TELEPHONE ENCOUNTER
Dr. Fish Taylor,     Please advise. Spoke to patient to confirm humalog. [facility] states he takes after meals. He confirmed that he takes Humalog 4-6 units 15 minutes before meals. RN educated on appropriate insulin administration. He is also requesting reason why he is having lows. RN advised unclear pattern and may be related to diet.  RN advised to snack between meals if noticing BG dropping the next few days

## 2024-01-04 ENCOUNTER — TELEPHONE (OUTPATIENT)
Dept: ENDOCRINOLOGY CLINIC | Facility: CLINIC | Age: 29
End: 2024-01-04

## 2024-01-04 DIAGNOSIS — E10.69 TYPE 1 DIABETES MELLITUS WITH OTHER SPECIFIED COMPLICATION (HCC): ICD-10-CM

## 2024-01-04 RX ORDER — INSULIN DEGLUDEC INJECTION 100 U/ML
6 INJECTION, SOLUTION SUBCUTANEOUS EVERY EVENING
Qty: 6 ML | Refills: 0 | Status: SHIPPED | OUTPATIENT
Start: 2024-01-04

## 2024-01-04 RX ORDER — ACYCLOVIR 400 MG/1
1 TABLET ORAL
Qty: 9 EACH | Refills: 0 | Status: SHIPPED | OUTPATIENT
Start: 2024-01-04

## 2024-01-04 RX ORDER — INSULIN LISPRO 100 [IU]/ML
INJECTION, SOLUTION INTRAVENOUS; SUBCUTANEOUS
Qty: 18 ML | Refills: 0 | Status: SHIPPED | OUTPATIENT
Start: 2024-01-04 | End: 2024-01-05

## 2024-01-04 NOTE — TELEPHONE ENCOUNTER
Pt is requesting for refills on Dexcom g7 sensor, freestyle janine 3 sensor, Humalog, pen needles, Tresiba flextouch, insulin lispro 200 unit please follow up pt is running low

## 2024-01-04 NOTE — TELEPHONE ENCOUNTER
Spoke with patient. He is on Tresiba 6 units daily and Humalog 4-6 units before meals. He needs Rxs sent to new pharmacy: [facility]. He wants to use Dexcom G7 which is covered by his insurance now. Pended Rxs.

## 2024-01-05 RX ORDER — INSULIN ASPART 100 [IU]/ML
INJECTION, SOLUTION INTRAVENOUS; SUBCUTANEOUS
Qty: 27 ML | Refills: 1 | Status: SHIPPED | OUTPATIENT
Start: 2024-01-05

## 2024-01-05 NOTE — TELEPHONE ENCOUNTER
Pharmacy requesting alternative for     Insulin Lispro, 1 Unit Dial, (HUMALOG KWIKPEN) 100 UNIT/ML Subcutaneous Solution Pen-injector, Inject 4-6 units 3 times daily, Disp: 18 mL, Rfl: 0    Only novolog and fiasp are covered.

## 2024-02-01 ENCOUNTER — HOSPITAL ENCOUNTER (OUTPATIENT)
Age: 29
Discharge: HOME | End: 2024-02-01
Payer: COMMERCIAL

## 2024-02-01 ENCOUNTER — PATIENT MESSAGE (OUTPATIENT)
Dept: ENDOCRINOLOGY CLINIC | Facility: CLINIC | Age: 29
End: 2024-02-01

## 2024-02-01 ENCOUNTER — MED REC SCAN ONLY (OUTPATIENT)
Dept: ENDOCRINOLOGY CLINIC | Facility: CLINIC | Age: 29
End: 2024-02-01

## 2024-02-01 VITALS
DIASTOLIC BLOOD PRESSURE: 59 MMHG | RESPIRATION RATE: 18 BRPM | OXYGEN SATURATION: 100 % | HEART RATE: 68 BPM | SYSTOLIC BLOOD PRESSURE: 111 MMHG | TEMPERATURE: 98 F

## 2024-02-01 DIAGNOSIS — E10.65 TYPE 1 DIABETES MELLITUS WITH HYPERGLYCEMIA (HCC): ICD-10-CM

## 2024-02-01 DIAGNOSIS — B34.9 VIRAL SYNDROME: Primary | ICD-10-CM

## 2024-02-01 DIAGNOSIS — Z20.822 ENCOUNTER FOR LABORATORY TESTING FOR COVID-19 VIRUS: ICD-10-CM

## 2024-02-01 LAB
#MXD IC: 0.5 X10ˆ3/UL (ref 0.1–1)
BUN BLD-MCNC: 9 MG/DL (ref 7–18)
CHLORIDE BLD-SCNC: 101 MMOL/L (ref 98–112)
CO2 BLD-SCNC: 28 MMOL/L (ref 21–32)
CREAT BLD-MCNC: 0.9 MG/DL
EGFRCR SERPLBLD CKD-EPI 2021: 119 ML/MIN/1.73M2 (ref 60–?)
GLUCOSE BLD-MCNC: 124 MG/DL (ref 70–99)
GLUCOSE BLDC GLUCOMTR-MCNC: 180 MG/DL (ref 70–99)
GLUCOSE BLDC GLUCOMTR-MCNC: 76 MG/DL (ref 70–99)
HCT VFR BLD AUTO: 42.7 %
HCT VFR BLD CALC: 46 %
HGB BLD-MCNC: 13.9 G/DL
ISTAT IONIZED CALCIUM FOR CHEM 8: 1.31 MMOL/L (ref 1.12–1.32)
LYMPHOCYTES # BLD AUTO: 2.4 X10ˆ3/UL (ref 1–4)
LYMPHOCYTES NFR BLD AUTO: 44.1 %
MCH RBC QN AUTO: 28.2 PG (ref 26–34)
MCHC RBC AUTO-ENTMCNC: 32.6 G/DL (ref 31–37)
MCV RBC AUTO: 86.6 FL (ref 80–100)
MIXED CELL %: 9.4 %
NEUTROPHILS # BLD AUTO: 2.6 X10ˆ3/UL (ref 1.5–7.7)
NEUTROPHILS NFR BLD AUTO: 46.5 %
PLATELET # BLD AUTO: 288 X10ˆ3/UL (ref 150–450)
POCT INFLUENZA A: NEGATIVE
POCT INFLUENZA B: NEGATIVE
POTASSIUM BLD-SCNC: 3.6 MMOL/L (ref 3.6–5.1)
RBC # BLD AUTO: 4.93 X10ˆ6/UL
SARS-COV-2 RNA RESP QL NAA+PROBE: NOT DETECTED
SODIUM BLD-SCNC: 141 MMOL/L (ref 136–145)
WBC # BLD AUTO: 5.5 X10ˆ3/UL (ref 4–11)

## 2024-02-01 PROCEDURE — 82962 GLUCOSE BLOOD TEST: CPT | Performed by: PHYSICIAN ASSISTANT

## 2024-02-01 PROCEDURE — 99203 OFFICE O/P NEW LOW 30 MIN: CPT | Performed by: PHYSICIAN ASSISTANT

## 2024-02-01 PROCEDURE — 85025 COMPLETE CBC W/AUTO DIFF WBC: CPT | Performed by: PHYSICIAN ASSISTANT

## 2024-02-01 PROCEDURE — 96360 HYDRATION IV INFUSION INIT: CPT | Performed by: PHYSICIAN ASSISTANT

## 2024-02-01 PROCEDURE — 69209 REMOVE IMPACTED EAR WAX UNI: CPT | Performed by: PHYSICIAN ASSISTANT

## 2024-02-01 PROCEDURE — 80047 BASIC METABLC PNL IONIZED CA: CPT | Performed by: PHYSICIAN ASSISTANT

## 2024-02-01 PROCEDURE — U0002 COVID-19 LAB TEST NON-CDC: HCPCS | Performed by: PHYSICIAN ASSISTANT

## 2024-02-01 PROCEDURE — 87502 INFLUENZA DNA AMP PROBE: CPT | Performed by: PHYSICIAN ASSISTANT

## 2024-02-01 RX ORDER — SODIUM CHLORIDE 9 MG/ML
1000 INJECTION, SOLUTION INTRAVENOUS ONCE
Status: COMPLETED | OUTPATIENT
Start: 2024-02-01 | End: 2024-02-01

## 2024-02-01 NOTE — TELEPHONE ENCOUNTER
Spoke to patient to relay Dr. Rg's message - he stated [facility] will go to [facility] today - he was going to rest for a little while first  Patient states he took additional 10 units novolog during the night d/t high BG - dexcom report printed for review  Patient confirms:  6 units treiba daily  Novolog  6 units TID with meals    Per Dr. Rg - patient should be assessed by PCP and may need increase in tresiba dose - dexcom report given to SAUL Velez to review    Patient stated understanding to call clinic with urgent needs since  messages may not be responded to for a few days

## 2024-02-01 NOTE — ED PROVIDER NOTES
Patient Seen in: [facility]      History     Chief Complaint   Patient presents with    Fatigue     Stated Complaint: Headache, fatigue    Subjective:   HPI    Patient is a 28-year-old male with type 1 diabetes, presenting to immediate care for evaluation of flu-like symptoms.  Onset: 4 days.  Associated: generalized fatigue, malaise, headache, nasal congestion, and dizziness.  Taking over-the-counter medication for symptoms with minimal relief. He is type I diabetic.  Elevated blood sugar readings in 200's on Dexcom.  Instructed by endocrinologist to increase Tresiba dose from 6 to 7 injection nightly.  Instructed to be seen for evaluation of viral/flulike symptoms.  He denies any fevers.  No sore throat.  No neck pain/stiffness.  No chest pain or shortness of breath.  No abdominal pain.  No nausea, vomiting, diarrhea.  No urinary symptoms.  No rash.  No weakness or confusion.  No recent travel.  No known sick contacts.    Objective:   Past Medical History:   Diagnosis Date    Type 1 diabetes mellitus (HCC)               History reviewed. No pertinent surgical history.             Social History     Socioeconomic History    Marital status:    Tobacco Use    Smoking status: Never     Passive exposure: Never    Smokeless tobacco: Never   Vaping Use    Vaping Use: Never used   Substance and Sexual Activity    Alcohol use: Not Currently    Drug use: Never              Review of Systems   Constitutional:  Positive for chills and fatigue. Negative for fever.   HENT:  Positive for congestion. Negative for sore throat, trouble swallowing and voice change.    Eyes:  Negative for visual disturbance.   Respiratory:  Positive for cough.    Gastrointestinal:  Negative for abdominal pain, diarrhea and vomiting.   Musculoskeletal:  Positive for myalgias. Negative for back pain, gait problem, joint swelling and neck stiffness.   Skin:  Negative for rash.   Neurological:  Positive for dizziness and headaches.  Negative for seizures, weakness and light-headedness.   Psychiatric/Behavioral:  Negative for confusion.    All other systems reviewed and are negative.      Positive for stated complaint: Headache, fatigue  Other systems are as noted in HPI.  Constitutional and vital signs reviewed.      All other systems reviewed and negative except as noted above.    Physical Exam     ED Triage Vitals [02/01/24 1558]   /59   Pulse 71   Resp 20   Temp 98.1 °F (36.7 °C)   Temp src Temporal   SpO2 100 %   O2 Device None (Room air)       Current:/59   Pulse 68   Temp 98.1 °F (36.7 °C) (Temporal)   Resp 18   SpO2 100%         Physical Exam  Vitals and nursing note reviewed.   Constitutional:       General: He is not in acute distress.     Appearance: Normal appearance. He is not ill-appearing, toxic-appearing or diaphoretic.   HENT:      Head: Normocephalic and atraumatic.      Right Ear: Tympanic membrane normal.      Left Ear: There is impacted cerumen.      Nose: Congestion present.      Mouth/Throat:      Mouth: Mucous membranes are moist.      Comments: Postnasal drip  Eyes:      Extraocular Movements: Extraocular movements intact.      Conjunctiva/sclera: Conjunctivae normal.      Pupils: Pupils are equal, round, and reactive to light.   Cardiovascular:      Rate and Rhythm: Normal rate.      Pulses: Normal pulses.   Pulmonary:      Effort: Pulmonary effort is normal. No respiratory distress.      Breath sounds: Normal breath sounds. No wheezing, rhonchi or rales.   Abdominal:      Comments: Soft nontender   Musculoskeletal:         General: Normal range of motion.      Cervical back: Normal range of motion and neck supple. No rigidity.   Neurological:      General: No focal deficit present.      Mental Status: He is alert and oriented to person, place, and time.      Motor: No weakness.      Coordination: Coordination normal.      Gait: Gait normal.      Comments: No cough focal deficit or weakness    Psychiatric:         Mood and Affect: Mood normal.         Behavior: Behavior normal.             ED Course     Labs Reviewed   POCT GLUCOSE - Abnormal; Notable for the following components:       Result Value    POC Glucose  180 (*)     All other components within normal limits   POCT ISTAT CHEM8 CARTRIDGE - Abnormal; Notable for the following components:    ISTAT Glucose 124 (*)     All other components within normal limits   POCT GLUCOSE - Normal   RAPID SARS-COV-2 BY PCR - Normal   POCT FLU TEST - Normal    Narrative:     This assay is a rapid molecular in vitro test utilizing nucleic acid amplification of influenza A and B viral RNA.   POCT CBC     Results for orders placed or performed during the hospital encounter of 02/01/24   Rapid SARS-CoV-2 by PCR    Collection Time: 02/01/24  4:02 PM    Specimen: Nares; Other   Result Value Ref Range    Rapid SARS-CoV-2 by PCR Not Detected Not Detected   POCT Flu Test    Collection Time: 02/01/24  4:02 PM    Specimen: Nares; Other   Result Value Ref Range    POCT INFLUENZA A Negative Negative    POCT INFLUENZA B Negative Negative   POCT Glucose    Collection Time: 02/01/24  4:18 PM   Result Value Ref Range    POC Glucose  180 (H) 70 - 99 mg/dL   POCT CBC    Collection Time: 02/01/24  4:28 PM   Result Value Ref Range    WBC IC 5.5 4.0 - 11.0 x10ˆ3/uL    RBC IC 4.93 4.30 - 5.70 X10ˆ6/uL    HGB IC 13.9 13.0 - 17.5 g/dL    HCT IC 42.7 39.0 - 53.0 %    MCV IC 86.6 80.0 - 100.0 fL    MCH IC 28.2 26.0 - 34.0 pg    MCHC IC 32.6 31.0 - 37.0 g/dL    PLT .0 150.0 - 450.0 X10ˆ3/uL    # Neutrophil 2.6 1.5 - 7.7 X10ˆ3/uL    # Lymphocyte 2.4 1.0 - 4.0 X10ˆ3/uL    # Mixed Cells 0.5 0.1 - 1.0 X10ˆ3/uL    Neutrophil % 46.5 %    Lymphocyte % 44.1 %    Mixed Cell % 9.4 %   POCT ISTAT chem8 cartridge    Collection Time: 02/01/24  4:34 PM   Result Value Ref Range    ISTAT Sodium 141 136 - 145 mmol/L    ISTAT BUN 9 7 - 18 mg/dL    ISTAT Potassium 3.6 3.6 - 5.1 mmol/L    ISTAT Chloride  101 98 - 112 mmol/L    ISTAT Ionized Calcium 1.31 1.12 - 1.32 mmol/L    ISTAT Hematocrit 46 37 - 53 %    ISTAT Glucose 124 (H) 70 - 99 mg/dL    ISTAT TCO2 28 21 - 32 mmol/L    ISTAT Creatinine 0.90 0.70 - 1.30 mg/dL    eGFR-Cr 119 >=60 mL/min/1.73m2   POCT Glucose    Collection Time: 02/01/24  5:09 PM   Result Value Ref Range    POC Glucose  76 70 - 99 mg/dL          MDM     Patient is a 28-year-old male with type 1 diabetes, presenting to immediate care for evaluation of flulike symptoms for 4 days.  Symptoms include generalized fatigue, malaise, headache, nasal congestion, and dizziness.  Endorses elevated blood sugar readings at home.  Instructed by endocrinology for further evaluation and management hyperglycemia.  Patient is well-appearing.  He medically stable.  Afebrile.  Nontachycardic.  Not hypoxic.  Glucose point-of-care elevated 180s.  Screening labs unremarkable.  Negative COVID and influenza PCR.  No leukocytosis no anemia electrolytes with normal limits.  Normal renal function.  No clinical signs or lab results suggestive DKA.  Patient given IV fluids for hyperglycemia.  Hyperglycemia improved. Tolerating p.o. irrigation of left ear performed.  Initial cerumen impaction.  Reevaluation ear canal clear without signs of infection, TM perforation or recurrent cerumen impaction. Plan for outpatient management with supportive care.  Rest. Oral hydration.  Motrin/Tylenol as needed for myalgias/pain/fevers. Nasal decongestant/Nasal spray for congestion. Endocrine follow-up for management of hyperglycemia.  ED return precautions.  Stable for discharge.  Case discussed with supervising physician Dr. Gustafson agrees to management plan      Medical Decision Making      Disposition and Plan     Clinical Impression:  1. Viral syndrome    2. Encounter for laboratory testing for COVID-19 virus    3. Type 1 diabetes mellitus with hyperglycemia (HCC)         Disposition:  Discharge  2/1/2024  5:19  pm    Follow-up:  Ivis Rg MD  [address] [place] [address], [address]  [facility] [address]  [phone]    Schedule an appointment as soon as possible for a visit   Endocrinologist, As needed          Medications Prescribed:  Discharge Medication List as of 2/1/2024  5:20 PM

## 2024-02-01 NOTE — TELEPHONE ENCOUNTER
From: Jeet Zhu  To: Ivis Rg  Sent: 2/1/2024 9:47 AM CST  Subject: Illness & High levels of BG    Hi Ivis    I’m feeling flu like symptoms (lightheadedness, flu, running nose, headache). My sugar level has been above range from Monday. Yesterday, I had dinner and I took my usual level of bolus but then I realised that the BG didn’t come down at all. Even though I took more than twice the normal level of bolus, it didn’t react. I was wondering if having an illness was causing this non-reactivity to bolus shots. I took a Tylenol And another bolus at the middle of the night and the level of sugar returned to the normal range I usually have.  I was wondering about your thoughts on these and if I should visit an urgent care nearby to take care of the flu/sugar levels.

## 2024-02-01 NOTE — TELEPHONE ENCOUNTER
Nephrology Daily Progress Note    Date of Service  12/16/2018    Chief Complaint  35 y.o. female admitted 12/4/2018 with ESRD, anemia  Found to have seizures    Interval Problem Update  Overall seems improved    Review of Systems  Review of Systems   All other systems reviewed and are negative.       Physical Exam  Temp:  [35.9 °C (96.7 °F)-36.4 °C (97.5 °F)] 36.4 °C (97.5 °F)  Pulse:  [57-99] 85  Resp:  [13-27] 16    Physical Exam   Constitutional: She appears well-developed.   Cardiovascular: Normal rate and regular rhythm.    Pulmonary/Chest: Effort normal and breath sounds normal.   Musculoskeletal: She exhibits no edema or tenderness.   Skin: She is not diaphoretic.       Fluids    Intake/Output Summary (Last 24 hours) at 12/16/18 1113  Last data filed at 12/16/18 1000   Gross per 24 hour   Intake             1180 ml   Output                0 ml   Net             1180 ml       Laboratory  Recent Labs      12/14/18   0500  12/15/18   0329  12/16/18   0505   WBC  8.4  8.7  5.4   RBC  3.48*  3.87*  3.22*   HEMOGLOBIN  10.2*  11.7*  9.9*   HEMATOCRIT  32.1*  35.7*  30.2*   MCV  92.2  92.2  93.8   MCH  29.3  30.2  30.7   MCHC  31.8*  32.8*  32.8*   RDW  45.7  46.2  45.6   PLATELETCT  137*  142*  138*   MPV  11.4  11.4  11.6     Recent Labs      12/14/18   0500  12/15/18   0329  12/16/18   0505   SODIUM  139  138  136   POTASSIUM  4.5  4.4  4.4   CHLORIDE  99  96  96   CO2  23  25  23   GLUCOSE  89  87  83   BUN  55*  36*  55*   CREATININE  9.97*  7.05*  9.44*   CALCIUM  9.5  10.1  9.9                   Imaging  MR-BRAIN-W/O   Final Result      1.  There is no hippocampal sclerosis.   2.  There is no evidence of cortical dysplasia or neuronal migrational abnormalities.   3.  A few nonspecific T2 hyperintensities in the supratentorial white matter likely representing nonspecific foci of gliosis, chronic ischemia and demyelination. This is unchanged since the previous MRI dated 2/23/2012.      DX-ABDOMEN FOR TUBE  Please call to triage  I do think  he will benefit from an immediate care visit  Please address the sugars , can please call me once you speak with him    He should also reach out to PCP for further evaluation of gina like symptoms    Lastly, please discuss that my chart is not meant for urgent matters and he should call  for these so that care can be provided on a timely basis    Thanks     PLACEMENT   Final Result      Feeding tube is looped upon itself, with a loop extending into the proximal duodenum, however tip remains at the gastric fundus.      DX-ABDOMEN FOR TUBE PLACEMENT   Final Result      Feeding tube looped upon itself with tip at the gastric fundus.      CT-HEAD W/O   Final Result      Mild cerebral cortical atrophy. No mass effect or acute hemorrhage.      CT-ABDOMEN-PELVIS W/O   Final Result         Interstitial prominence in lung bases with pleural effusions consistent with pulmonary edema.      Anasarca and trace ascites.      No evidence of acute abdominal or pelvic pathology.      DX-CHEST-PORTABLE (1 VIEW)   Final Result      Enlarged cardiac silhouette with interstitial prominence consistent with pulmonary edema with probable left pleural fluid.           Assessment/Plan    1. ESRD -HD next tomorrow  2. Volume overload -stable  3.  Anemia of chronic kidney disease, no Epogen    -HD next tomorrow

## 2024-02-01 NOTE — TELEPHONE ENCOUNTER
Reviewed Dexcom G7 download. Overall, blood sugars are stable. Some morning, slight increase around 4-6am. Due to this pattern and while pt is sick, advised to increase Tresiba 6 --> 7 units    Mychart message sent to pt

## 2024-02-01 NOTE — ED INITIAL ASSESSMENT (HPI)
Pt states having fatigue and HA since Monday, Pt states has been trying to take OTC medication but not helping much. Pt states is diabetic but noticed that his sugars weren't going down like they usually after his dose of insulin. Pt states today as well after lunch took his insulin and didn't go down. Pt has dexcom and currently sugars are reading at 206.

## 2024-02-02 ENCOUNTER — TELEPHONE (OUTPATIENT)
Dept: FAMILY MEDICINE CLINIC | Facility: CLINIC | Age: 29
End: 2024-02-02

## 2024-02-02 NOTE — TELEPHONE ENCOUNTER
Pt is requesting order of CT Abdomen & Pelvis to be faxed to [facility].   Fax number     Pt is scheduled for CT on 2/9/24.

## 2024-02-13 ENCOUNTER — OFFICE VISIT (OUTPATIENT)
Dept: ENDOCRINOLOGY CLINIC | Facility: CLINIC | Age: 29
End: 2024-02-13

## 2024-02-13 VITALS
BODY MASS INDEX: 26.74 KG/M2 | DIASTOLIC BLOOD PRESSURE: 76 MMHG | SYSTOLIC BLOOD PRESSURE: 132 MMHG | WEIGHT: 191 LBS | HEIGHT: 71 IN | HEART RATE: 67 BPM

## 2024-02-13 DIAGNOSIS — E78.5 DYSLIPIDEMIA: ICD-10-CM

## 2024-02-13 DIAGNOSIS — E10.69 TYPE 1 DIABETES MELLITUS WITH OTHER SPECIFIED COMPLICATION (HCC): Primary | ICD-10-CM

## 2024-02-13 LAB
CARTRIDGE LOT#: ABNORMAL NUMERIC
GLUCOSE BLOOD: 174
HEMOGLOBIN A1C: 7.5 % (ref 4.3–5.6)
TEST STRIP LOT #: NORMAL NUMERIC

## 2024-02-13 PROCEDURE — 99214 OFFICE O/P EST MOD 30 MIN: CPT | Performed by: INTERNAL MEDICINE

## 2024-02-13 PROCEDURE — 83036 HEMOGLOBIN GLYCOSYLATED A1C: CPT | Performed by: INTERNAL MEDICINE

## 2024-02-13 PROCEDURE — 95251 CONT GLUC MNTR ANALYSIS I&R: CPT | Performed by: INTERNAL MEDICINE

## 2024-02-13 PROCEDURE — 82947 ASSAY GLUCOSE BLOOD QUANT: CPT | Performed by: INTERNAL MEDICINE

## 2024-02-13 RX ORDER — INSULIN ASPART 100 [IU]/ML
INJECTION, SOLUTION INTRAVENOUS; SUBCUTANEOUS
Qty: 45 ML | Refills: 0 | Status: SHIPPED | OUTPATIENT
Start: 2024-02-13

## 2024-02-13 NOTE — PROGRESS NOTES
FU VISIT    CHIEF COMPLAINT:    DM     HISTORY OF PRESENT ILLNESS:   Jeet Zhu is a 28 year old male who is here to FU for DM.     FH of DM     DM HISTORY  Diagnosed: 2023      HISTORY OF DIABETES COMPLICATIONS: :  History of Retinopathy: will be due around age 33, he has an apt  next week . He will keep that apt and discuss about further FU with eye doctor  History of Neuropathy: denies  History of Nephropathy: no    ASSOCIATED COMPLICATIONS:   HTN: denies  Hyperlipidemia: denies  Coronary Artery Disease:  denies  Cerebrovascular Disease: denies      HOME GLUCOSE READINGS:     CGM download as below    CURRENT DIABETIC MEDICATIONS INCLUDE:  Tresiba 7 nightly  Novolog 4-5 TID with meals    MEALS:  Good compliance with diet    EXERCISE:  yes      CURRENT MEDICATIONS:    Current Outpatient Medications   Medication Sig Dispense Refill    insulin aspart (NOVOLOG FLEXPEN) 100 Units/mL Subcutaneous Solution Pen-injector Inject three times daily with meals as directed based on crb ratio and correction factor. Max daily dose of 35 units 45 mL 0    Insulin Degludec (TRESIBA FLEXTOUCH) 100 UNIT/ML Subcutaneous Solution Pen-injector Inject 0.06 mL (6 Units total) into the skin every evening. 6 mL 0    Continuous Blood Gluc Sensor (DEXCOM G7 SENSOR) Does not apply Misc 1 each Every 10 days. Change sensor every 10 days 9 each 0    Insulin Pen Needle 32G X 4 MM Does not apply Misc Use to inject 400 each 1    Insulin Pen Needle (PEN NEEDLES) 32G X 4 MM Does not apply Misc 1 each 5 (five) times daily. 150 each 2    cholecalciferol 125 MCG (5000 UT) Oral Tab Take 1 tablet (5,000 Units total) by mouth daily.         PAST MEDICAL HISTORY:   Past Medical History:   Diagnosis Date    Type 1 diabetes mellitus (HCC)        PAST SURGICAL HISTORY:   History reviewed. No pertinent surgical history.    ALLERGIES:  No Known Allergies    SOCIAL HISTORY:    Social History     Socioeconomic History    Marital status:    Tobacco Use     Smoking status: Never     Passive exposure: Never    Smokeless tobacco: Never   Vaping Use    Vaping Use: Never used   Substance and Sexual Activity    Alcohol use: Not Currently    Drug use: Never       FAMILY HISTORY:   Family History   Problem Relation Age of Onset    Pancreatic Cancer Father     Pancreatic Cancer Paternal Grandfather     Uterine Cancer Other        ASSESSMENTS:        REVIEW OF SYSTEMS:  Constitutional: Negative for: fever, +fatigue, cold/heat intolerance , weight gain   Eyes: Negative for:  Visual changes, proptosis, blurring, floaters, poor night vision, impaired color vision  ENT: Negative for:  dysphagia, neck swelling, dysphonia  Respiratory: Negative for:  dyspnea, cough  Cardiovascular: Negative for:  chest pain, palpitations, orthopnea  GI: Negative for:  abdominal pain, nausea, vomiting, diarrhea, constipation, bleeding  Neurology: Negative for: headache, numbness, weakness,   Genito-Urinary: Negative for: dysuria, frequency  Psychiatric: Negative for:  depression, anxiety  Hematology/Lymphatics: Negative for: bruising, lower extremity edema  Endocrine: Negative: polyuria, polydypsia  Skin: Negative for: rash, blister,      PHYSICAL EXAM:   Vitals:    02/13/24 1540   BP: 132/76   Pulse: 67   Weight: 191 lb (86.6 kg)   Height: 5' 11\" (1.803 m)       BMI: Body mass index is 26.64 kg/m².         General Appearance:  alert, well developed, in no acute distress  Head: Atraumatic  Eyes:  normal conjunctivae, sclera., normal sclera and normal pupils  Throat/Neck: normal sound to voice. Normal hearing, normal speech  Respiratory:  Speaking in full sentences, non-labored. no increased work of breathing, no audible wheezing    Neuro: motor grossly intact, moving all extremities without difficulty  Psychiatric:  oriented to time, self, and place  Extremities: no obvious extremity swelling, no lesions      DATA:     Pertinent data reviewed  A1c is 7.5 %     ASSESSMENT AND PLAN:    1. Type 1  DM:     Plan:  Discussed the pathogenesis, natural course of diabetes. Patient understands the importance of glycemic control and the implications of uncontrolled diabetes including Diabetic ketoacidosis and various micro vascular and macrovascular complications.        a). Medications:    Continuous Glucose Monitoring Interpretation    Jeet Zhu has undergone continuous glucose monitoring with the personal dexcom  He was evaluated with the dexcom from Jan 31 to Feb 13, 2024.  His blood glucose tracings demonstrated :   Very high  6 % very high  High 26 %   Normal 67 % in range  Low < 1 %   Very low  < 1 %      As a result of his testing the following plan was made:     Tresiba  8 nightly for a few days ; if in 4-5 days fasting suars are not under 130, he will increase to 9 unit nightly   Novolog Based on ICR and CF  ICR : 1:10 for higher carb meals and 1:12 for lower carb meals  CF 1: 50 > 150      Check and call with BG as discussed    b). No Nephropathy   c). Discussed importance of annual eye exams  d). Foot exam: Daily feet exam explained  e). BG log maintainence explained in great detail, to get log and glucometer on next visit.  f). Life style changes reviewed  g). Hypoglycemia recognition and management discussed    2. Patient’s BP is normal today  3. Dyslipidemia  A) Discussed lifestyle modifications including reductions in dietary total and saturated fat, weight loss, aerobic exercise, and eating a diet rich in fruits and vegetables.  B) Fasting lipid panel    Call with BG as discussed    RTC in 3-4 months    Orders Placed This Encounter   Procedures    Lipid Panel [E]    POC HemoCue Glucose 201 (Finger stick glucose)    POC Glycohemoglobin [44422]           Ivis Rg MD        Patient verbalized a complete  understanding of all of the above and did not have any further questions.

## 2024-03-31 DIAGNOSIS — E10.69 TYPE 1 DIABETES MELLITUS WITH OTHER SPECIFIED COMPLICATION (HCC): ICD-10-CM

## 2024-04-01 RX ORDER — ACYCLOVIR 400 MG/1
1 TABLET ORAL
Qty: 9 EACH | Refills: 0 | Status: SHIPPED | OUTPATIENT
Start: 2024-04-01

## 2024-04-01 RX ORDER — INSULIN ASPART 100 [IU]/ML
INJECTION, SOLUTION INTRAVENOUS; SUBCUTANEOUS
Qty: 45 ML | Refills: 0 | Status: SHIPPED | OUTPATIENT
Start: 2024-04-01

## 2024-04-01 RX ORDER — INSULIN DEGLUDEC 100 U/ML
8 INJECTION, SOLUTION SUBCUTANEOUS EVERY EVENING
Qty: 8 ML | Refills: 0 | Status: SHIPPED | OUTPATIENT
Start: 2024-04-01 | End: 2024-06-30

## 2024-06-18 ENCOUNTER — OFFICE VISIT (OUTPATIENT)
Dept: ENDOCRINOLOGY CLINIC | Facility: CLINIC | Age: 29
End: 2024-06-18

## 2024-06-18 VITALS
HEART RATE: 60 BPM | DIASTOLIC BLOOD PRESSURE: 72 MMHG | BODY MASS INDEX: 26 KG/M2 | SYSTOLIC BLOOD PRESSURE: 112 MMHG | WEIGHT: 187 LBS

## 2024-06-18 DIAGNOSIS — E78.5 DYSLIPIDEMIA: ICD-10-CM

## 2024-06-18 DIAGNOSIS — E10.69 TYPE 1 DIABETES MELLITUS WITH OTHER SPECIFIED COMPLICATION (HCC): Primary | ICD-10-CM

## 2024-06-18 LAB
GLUCOSE BLOOD: 185
HEMOGLOBIN A1C: 7.3 % (ref 4.3–5.6)
TEST STRIP LOT #: NORMAL NUMERIC

## 2024-06-18 PROCEDURE — 95251 CONT GLUC MNTR ANALYSIS I&R: CPT | Performed by: INTERNAL MEDICINE

## 2024-06-18 PROCEDURE — 82947 ASSAY GLUCOSE BLOOD QUANT: CPT | Performed by: INTERNAL MEDICINE

## 2024-06-18 PROCEDURE — 99214 OFFICE O/P EST MOD 30 MIN: CPT | Performed by: INTERNAL MEDICINE

## 2024-06-18 PROCEDURE — 83036 HEMOGLOBIN GLYCOSYLATED A1C: CPT | Performed by: INTERNAL MEDICINE

## 2024-06-18 NOTE — PROGRESS NOTES
FU VISIT    CHIEF COMPLAINT:    DM     HISTORY OF PRESENT ILLNESS:   Jeet Zhu is a 28 year old male who is here to FU for DM.     FH of DM     DM HISTORY  Diagnosed: 2023      HISTORY OF DIABETES COMPLICATIONS: :  History of Retinopathy: will be due around age 33  History of Neuropathy: denies  History of Nephropathy: no    ASSOCIATED COMPLICATIONS:   HTN: denies  Hyperlipidemia: denies  Coronary Artery Disease:  denies  Cerebrovascular Disease: denies      HOME GLUCOSE READINGS:     CGM download as below    CURRENT DIABETIC MEDICATIONS INCLUDE:  Tresiba  8 nightly   Novolog Based on ICR and CF  ICR : 1.5 units for every 10 gm of carbs  CF 1: 50 > 150    MEALS:  Good compliance with diet    EXERCISE:  yes      CURRENT MEDICATIONS:    Current Outpatient Medications   Medication Sig Dispense Refill    insulin aspart (NOVOLOG FLEXPEN) 100 Units/mL Subcutaneous Solution Pen-injector Inject three times daily with meals as directed based on crb ratio and correction factor. Max daily dose of 35 units 45 mL 0    insulin degludec (TRESIBA FLEXTOUCH) 100 UNIT/ML Subcutaneous Solution Pen-injector Inject 8 Units into the skin every evening. 8 mL 0    Continuous Blood Gluc Sensor (DEXCOM G7 SENSOR) Does not apply Misc 1 each Every 10 days. Change sensor every 10 days 9 each 0    Insulin Pen Needle 32G X 4 MM Does not apply Misc Use to inject 400 each 1    Insulin Pen Needle (PEN NEEDLES) 32G X 4 MM Does not apply Misc 1 each 5 (five) times daily. 150 each 2       PAST MEDICAL HISTORY:   Past Medical History:    Type 1 diabetes mellitus (HCC)       PAST SURGICAL HISTORY:   No past surgical history on file.    ALLERGIES:  No Known Allergies    SOCIAL HISTORY:    Social History     Socioeconomic History    Marital status:    Tobacco Use    Smoking status: Never     Passive exposure: Never    Smokeless tobacco: Never   Vaping Use    Vaping status: Never Used   Substance and Sexual Activity    Alcohol use: Not  Currently    Drug use: Never       FAMILY HISTORY:   Family History   Problem Relation Age of Onset    Pancreatic Cancer Father     Pancreatic Cancer Paternal Grandfather     Uterine Cancer Other        ASSESSMENTS:        REVIEW OF SYSTEMS:  Constitutional: Negative for: fever, +fatigue, cold/heat intolerance , weight gain   Eyes: Negative for:  Visual changes, proptosis, blurring, floaters, poor night vision, impaired color vision  ENT: Negative for:  dysphagia, neck swelling, dysphonia  Respiratory: Negative for:  dyspnea, cough  Cardiovascular: Negative for:  chest pain, palpitations, orthopnea  GI: Negative for:  abdominal pain, nausea, vomiting, diarrhea, constipation, bleeding  Neurology: Negative for: headache, numbness, weakness,   Genito-Urinary: Negative for: dysuria, frequency  Psychiatric: Negative for:  depression, anxiety  Hematology/Lymphatics: Negative for: bruising, lower extremity edema  Endocrine: Negative: polyuria, polydypsia  Skin: Negative for: rash, blister,      PHYSICAL EXAM:   Vitals:    06/18/24 1608   BP: 112/72   Pulse: 60   Weight: 187 lb (84.8 kg)       BMI: Body mass index is 26.08 kg/m².         General Appearance:  alert, well developed, in no acute distress  Head: Atraumatic  Eyes:  normal conjunctivae, sclera., normal sclera and normal pupils  Throat/Neck: normal sound to voice. Normal hearing, normal speech  Respiratory:  Speaking in full sentences, non-labored. no increased work of breathing, no audible wheezing    Neuro: motor grossly intact, moving all extremities without difficulty  Psychiatric:  oriented to time, self, and place  Extremities: June 2024  Bilateral barefoot skin diabetic exam is normal, visualized feet and the appearance is normal.  Bilateral monofilament/sensation of both feet is normal.  Pulsation pedal pulse exam of both lower legs/feet is normal as well.          DATA:     Pertinent data reviewed  A1c is 7.3  %     ASSESSMENT AND PLAN:    1. Type 1 DM:      Plan:  Discussed the pathogenesis, natural course of diabetes. Patient understands the importance of glycemic control and the implications of uncontrolled diabetes including Diabetic ketoacidosis and various micro vascular and macrovascular complications.        a). Medications:    Continuous Glucose Monitoring Interpretation    Jeet Zhu has undergone continuous glucose monitoring with the personal dexcom  He was evaluated with the dexcom from June 5-18 , 2024.  His blood glucose tracings demonstrated :   Very high   12 %  High 32  %   Normal  54 % in range  Low 1 %   Very low  < 1 %      As a result of his testing the following plan was made:     Tresiba  8--> 10  nightly   Novolog Based on ICR and CF    ICR : 1.5 units for every 10 -> 9 gm of carbs    CF 1: 50 > 150      Check and call with BG as discussed    b). No Nephropathy   c). Discussed importance of annual eye exams  d). Foot exam: Daily feet exam explained  e). BG log maintainence explained in great detail, to get log and glucometer on next visit.  f). Life style changes reviewed  g). Hypoglycemia recognition and management discussed    2. Patient’s BP is normal today  3. Dyslipidemia  A) Discussed lifestyle modifications including reductions in dietary total and saturated fat, weight loss, aerobic exercise, and eating a diet rich in fruits and vegetables.  B) Fasting lipid panel    Call with BG as discussed    RTC in 3-4 months    Orders Placed This Encounter   Procedures    POC HemoCue Glucose 201 (Finger stick glucose)    POC Glycohemoglobin [18994]    Lipid Panel [E]           Ivis Rg MD        Patient verbalized a complete  understanding of all of the above and did not have any further questions.

## 2024-07-05 RX ORDER — ACYCLOVIR 400 MG/1
1 TABLET ORAL
Qty: 9 EACH | Refills: 0 | Status: SHIPPED | OUTPATIENT
Start: 2024-07-05

## 2024-07-11 RX ORDER — PEN NEEDLE, DIABETIC 32GX 5/32"
1 NEEDLE, DISPOSABLE MISCELLANEOUS 4 TIMES DAILY
Qty: 400 EACH | Refills: 1 | Status: SHIPPED | OUTPATIENT
Start: 2024-07-11

## 2024-07-11 NOTE — TELEPHONE ENCOUNTER
Endocrine Refill protocol for Glucose testing supplies       Protocol Criteria: passed  Appointment with Endocrinology completed in the last 12 months or scheduled in the next 6 months     Verify appointment has been completed or scheduled in the appropriate timeline. If so can send a 90 day supply with 1 refill.        Last completed office visit: 6/18/2024 Ivis Rg MD   Next scheduled Follow up: No future appointments.

## 2024-09-16 NOTE — TELEPHONE ENCOUNTER
Endocrine refill protocol for basal insulins     Protocol Criteria: PASSED Reason: N/A  - Appointment with Endocrinology completed in the last 6 months or scheduled in the next 3 months    - A1c result completed in the last 6 months and is below 8.5%     Verify appointment has been completed or scheduled in the appropriate timeline. If so can send a 90 day supply with 1 refill per provider protocol.    Verify A1c has been completed within the last 6 months and is below 8.5%     Last completed office visit:6/18/2024 Ivis Rg MD   Next scheduled Follow up: No future appointments.   Last A1c result: Last A1c value was 7.3% done 6/18/2024.

## 2024-09-17 RX ORDER — INSULIN ASPART 100 [IU]/ML
INJECTION, SOLUTION INTRAVENOUS; SUBCUTANEOUS
Qty: 45 ML | Refills: 0 | Status: SHIPPED | OUTPATIENT
Start: 2024-09-17

## 2024-09-17 RX ORDER — PEN NEEDLE, DIABETIC 32GX 5/32"
NEEDLE, DISPOSABLE MISCELLANEOUS
Qty: 400 EACH | Refills: 0 | Status: SHIPPED | OUTPATIENT
Start: 2024-09-17

## 2024-09-17 RX ORDER — ACYCLOVIR 400 MG/1
1 TABLET ORAL
Qty: 9 EACH | Refills: 0 | Status: SHIPPED | OUTPATIENT
Start: 2024-09-17

## 2024-09-17 NOTE — TELEPHONE ENCOUNTER
Endocrine Refill protocol for Glucose testing supplies     Protocol Criteria: PASSED Reason: N/A  Appointment with Endocrinology completed in the last 12 months or scheduled in the next 6 months     Verify appointment has been completed or scheduled in the appropriate timeline. If so can send a 90 day supply with 1 refill.     Last completed office visit: 6/18/2024 Ivis Rg MD   Next scheduled Follow up: No future appointments. Mcm sent

## 2024-09-17 NOTE — TELEPHONE ENCOUNTER
Called pt to schedule appt, either in person or VV, with Dr. Rg, pt states that he not available to make appt right now due to other appts and that he will be going to the [place] next week and will be out for 1 year. Pt states he call back or send MC with any questions.    99.6

## 2024-09-18 RX ORDER — INSULIN DEGLUDEC 100 U/ML
10 INJECTION, SOLUTION SUBCUTANEOUS EVERY EVENING
Qty: 9 ML | Refills: 0 | Status: SHIPPED | OUTPATIENT
Start: 2024-09-18 | End: 2024-12-17

## 2024-09-18 RX ORDER — INSULIN DEGLUDEC 100 U/ML
8 INJECTION, SOLUTION SUBCUTANEOUS EVERY EVENING
COMMUNITY
Start: 2024-07-08 | End: 2024-09-18

## 2025-07-28 NOTE — ED INITIAL ASSESSMENT (HPI)
PAST MEDICAL HISTORY:  Angina pectoris Anginal pain    Atherosclerosis of coronary artery CAD (coronary artery disease)    Essential hypertension HTN (hypertension)    Nondependent alcohol abuse Alcohol abuse    Pancreatitis     Polysubstance abuse      Pt arrived to ED for evaluation due to hyperglyceia and ketones in urine, pt does not have history of diabetes. Pt also c/o right sided abdominal pain, denies N/V/D. Pt reports that he was seen at a different ER and they brought his sugar down, but PCP Dr. Linda Gomez advised him to return to ER for new onset diabetes.  in triage.

## (undated) NOTE — LETTER
10/27/2023              Harriet Jane        [address] [place] [id number]         To Whom It May Concern,    Iqra Jackson is a patient of our practice. Please excuse him for a total of 2 weeks of work starting 10/30/23 through 11/13/2023 due to medical conditions.  If there are any questions, please reach out to us at [phone]    Sincerely,  Electronically signed by  Tao Oropeza MD  [facility], [address] [place] [address]  [place] [address] [place] [address]  [address] [facility]

## (undated) NOTE — Clinical Note
Thank you for the consult. I saw Mr. Angie Campuzano in the endocrine/diabetes clinic today. Please see attached my note. Please feel free to contact me with any questions. Thanks!